# Patient Record
Sex: MALE | Race: BLACK OR AFRICAN AMERICAN | NOT HISPANIC OR LATINO | URBAN - METROPOLITAN AREA
[De-identification: names, ages, dates, MRNs, and addresses within clinical notes are randomized per-mention and may not be internally consistent; named-entity substitution may affect disease eponyms.]

---

## 2017-07-24 ENCOUNTER — INPATIENT (INPATIENT)
Facility: HOSPITAL | Age: 37
LOS: 2 days | Discharge: HOME | End: 2017-07-27
Attending: INTERNAL MEDICINE | Admitting: INTERNAL MEDICINE
Payer: COMMERCIAL

## 2017-07-24 VITALS
SYSTOLIC BLOOD PRESSURE: 105 MMHG | DIASTOLIC BLOOD PRESSURE: 63 MMHG | RESPIRATION RATE: 17 BRPM | HEART RATE: 72 BPM | TEMPERATURE: 100 F | OXYGEN SATURATION: 99 %

## 2017-07-24 DIAGNOSIS — Z29.9 ENCOUNTER FOR PROPHYLACTIC MEASURES, UNSPECIFIED: ICD-10-CM

## 2017-07-24 DIAGNOSIS — N17.9 ACUTE KIDNEY FAILURE, UNSPECIFIED: ICD-10-CM

## 2017-07-24 DIAGNOSIS — R50.9 FEVER, UNSPECIFIED: ICD-10-CM

## 2017-07-24 DIAGNOSIS — Z98.890 OTHER SPECIFIED POSTPROCEDURAL STATES: Chronic | ICD-10-CM

## 2017-07-24 DIAGNOSIS — N05.9 UNSPECIFIED NEPHRITIC SYNDROME WITH UNSPECIFIED MORPHOLOGIC CHANGES: ICD-10-CM

## 2017-07-24 DIAGNOSIS — K80.20 CALCULUS OF GALLBLADDER WITHOUT CHOLECYSTITIS WITHOUT OBSTRUCTION: ICD-10-CM

## 2017-07-24 LAB
ALBUMIN SERPL ELPH-MCNC: 2.9 G/DL — LOW (ref 3.3–5)
ALP SERPL-CCNC: 48 U/L — SIGNIFICANT CHANGE UP (ref 40–120)
ALT FLD-CCNC: 18 U/L — SIGNIFICANT CHANGE UP (ref 4–41)
APPEARANCE UR: CLEAR — SIGNIFICANT CHANGE UP
AST SERPL-CCNC: 17 U/L — SIGNIFICANT CHANGE UP (ref 4–40)
BACTERIA # UR AUTO: SIGNIFICANT CHANGE UP
BASOPHILS # BLD AUTO: 0.03 K/UL — SIGNIFICANT CHANGE UP (ref 0–0.2)
BASOPHILS NFR BLD AUTO: 0.6 % — SIGNIFICANT CHANGE UP (ref 0–2)
BILIRUB SERPL-MCNC: 0.3 MG/DL — SIGNIFICANT CHANGE UP (ref 0.2–1.2)
BILIRUB UR-MCNC: NEGATIVE — SIGNIFICANT CHANGE UP
BLOOD UR QL VISUAL: HIGH
BUN SERPL-MCNC: 18 MG/DL — SIGNIFICANT CHANGE UP (ref 7–23)
C3 SERPL-MCNC: 88.1 MG/DL — LOW (ref 90–180)
C4 SERPL-MCNC: 13.2 MG/DL — SIGNIFICANT CHANGE UP (ref 10–40)
CALCIUM SERPL-MCNC: 8.3 MG/DL — LOW (ref 8.4–10.5)
CHLORIDE SERPL-SCNC: 104 MMOL/L — SIGNIFICANT CHANGE UP (ref 98–107)
CK SERPL-CCNC: 96 U/L — SIGNIFICANT CHANGE UP (ref 30–200)
CO2 SERPL-SCNC: 24 MMOL/L — SIGNIFICANT CHANGE UP (ref 22–31)
COLOR SPEC: YELLOW — SIGNIFICANT CHANGE UP
CREAT SERPL-MCNC: 1.66 MG/DL — HIGH (ref 0.5–1.3)
CRP SERPL-MCNC: 7.2 MG/L — HIGH (ref 0.3–5)
EOSINOPHIL # BLD AUTO: 0.09 K/UL — SIGNIFICANT CHANGE UP (ref 0–0.5)
EOSINOPHIL NFR BLD AUTO: 1.8 % — SIGNIFICANT CHANGE UP (ref 0–6)
ERYTHROCYTE [SEDIMENTATION RATE] IN BLOOD: 18 MM/HR — HIGH (ref 1–15)
GLUCOSE SERPL-MCNC: 115 MG/DL — HIGH (ref 70–99)
GLUCOSE UR-MCNC: NEGATIVE — SIGNIFICANT CHANGE UP
GRAN CASTS # UR COMP ASSIST: SIGNIFICANT CHANGE UP
HBA1C BLD-MCNC: 6.1 % — HIGH (ref 4–5.6)
HCT VFR BLD CALC: 36.3 % — LOW (ref 39–50)
HGB BLD-MCNC: 12.2 G/DL — LOW (ref 13–17)
HIV1 AG SER QL: SIGNIFICANT CHANGE UP
HIV1+2 AB SPEC QL: SIGNIFICANT CHANGE UP
HYALINE CASTS # UR AUTO: SIGNIFICANT CHANGE UP (ref 0–?)
IGA FLD-MCNC: 275 MG/DL — SIGNIFICANT CHANGE UP (ref 70–400)
IGG FLD-MCNC: 838 MG/DL — SIGNIFICANT CHANGE UP (ref 700–1600)
IGM SERPL-MCNC: 100 MG/DL — SIGNIFICANT CHANGE UP (ref 40–230)
IMM GRANULOCYTES # BLD AUTO: 0.01 # — SIGNIFICANT CHANGE UP
IMM GRANULOCYTES NFR BLD AUTO: 0.2 % — SIGNIFICANT CHANGE UP (ref 0–1.5)
KETONES UR-MCNC: NEGATIVE — SIGNIFICANT CHANGE UP
LEUKOCYTE ESTERASE UR-ACNC: NEGATIVE — SIGNIFICANT CHANGE UP
LYMPHOCYTES # BLD AUTO: 1.23 K/UL — SIGNIFICANT CHANGE UP (ref 1–3.3)
LYMPHOCYTES # BLD AUTO: 24.1 % — SIGNIFICANT CHANGE UP (ref 13–44)
MANUAL SMEAR VERIFICATION: SIGNIFICANT CHANGE UP
MCHC RBC-ENTMCNC: 29.8 PG — SIGNIFICANT CHANGE UP (ref 27–34)
MCHC RBC-ENTMCNC: 33.6 % — SIGNIFICANT CHANGE UP (ref 32–36)
MCV RBC AUTO: 88.5 FL — SIGNIFICANT CHANGE UP (ref 80–100)
MONOCYTES # BLD AUTO: 0.57 K/UL — SIGNIFICANT CHANGE UP (ref 0–0.9)
MONOCYTES NFR BLD AUTO: 11.2 % — SIGNIFICANT CHANGE UP (ref 2–14)
MORPHOLOGY BLD-IMP: SIGNIFICANT CHANGE UP
MUCOUS THREADS # UR AUTO: SIGNIFICANT CHANGE UP
NEUTROPHILS # BLD AUTO: 3.17 K/UL — SIGNIFICANT CHANGE UP (ref 1.8–7.4)
NEUTROPHILS NFR BLD AUTO: 62.1 % — SIGNIFICANT CHANGE UP (ref 43–77)
NITRITE UR-MCNC: NEGATIVE — SIGNIFICANT CHANGE UP
NON-SQ EPI CELLS # UR AUTO: <1 — SIGNIFICANT CHANGE UP
NRBC # FLD: 0 — SIGNIFICANT CHANGE UP
PH UR: 6.5 — SIGNIFICANT CHANGE UP (ref 4.6–8)
PLATELET # BLD AUTO: 188 K/UL — SIGNIFICANT CHANGE UP (ref 150–400)
PLATELET COUNT - ESTIMATE: NORMAL — SIGNIFICANT CHANGE UP
PMV BLD: 10.6 FL — SIGNIFICANT CHANGE UP (ref 7–13)
POTASSIUM SERPL-MCNC: 3.6 MMOL/L — SIGNIFICANT CHANGE UP (ref 3.5–5.3)
POTASSIUM SERPL-SCNC: 3.6 MMOL/L — SIGNIFICANT CHANGE UP (ref 3.5–5.3)
PROT SERPL-MCNC: 5.6 G/DL — LOW (ref 6–8.3)
PROT UR-MCNC: >600 — SIGNIFICANT CHANGE UP
RBC # BLD: 4.1 M/UL — LOW (ref 4.2–5.8)
RBC # FLD: 12.9 % — SIGNIFICANT CHANGE UP (ref 10.3–14.5)
RBC CASTS # UR COMP ASSIST: SIGNIFICANT CHANGE UP (ref 0–?)
REVIEW TO FOLLOW: YES — SIGNIFICANT CHANGE UP
RHEUMATOID FACT SERPL-ACNC: 7.5 IU/ML — SIGNIFICANT CHANGE UP
SODIUM SERPL-SCNC: 139 MMOL/L — SIGNIFICANT CHANGE UP (ref 135–145)
SP GR SPEC: 1.04 — HIGH (ref 1–1.03)
SQUAMOUS # UR AUTO: SIGNIFICANT CHANGE UP
TSH SERPL-MCNC: 1.1 UIU/ML — SIGNIFICANT CHANGE UP (ref 0.27–4.2)
UROBILINOGEN FLD QL: NORMAL E.U. — SIGNIFICANT CHANGE UP (ref 0.1–0.2)
WBC # BLD: 5.1 K/UL — SIGNIFICANT CHANGE UP (ref 3.8–10.5)
WBC # FLD AUTO: 5.1 K/UL — SIGNIFICANT CHANGE UP (ref 3.8–10.5)
WBC CLUMPS #/AREA URNS HPF: PRESENT — HIGH (ref 0–?)
WBC UR QL: HIGH (ref 0–?)

## 2017-07-24 PROCEDURE — 99223 1ST HOSP IP/OBS HIGH 75: CPT | Mod: GC

## 2017-07-24 PROCEDURE — 84165 PROTEIN E-PHORESIS SERUM: CPT | Mod: 26

## 2017-07-24 PROCEDURE — 76775 US EXAM ABDO BACK WALL LIM: CPT | Mod: 26

## 2017-07-24 PROCEDURE — 71020: CPT | Mod: 26

## 2017-07-24 PROCEDURE — 99223 1ST HOSP IP/OBS HIGH 75: CPT | Mod: AI,GC

## 2017-07-24 RX ORDER — SODIUM CHLORIDE 9 MG/ML
1000 INJECTION INTRAMUSCULAR; INTRAVENOUS; SUBCUTANEOUS ONCE
Qty: 0 | Refills: 0 | Status: COMPLETED | OUTPATIENT
Start: 2017-07-24 | End: 2017-07-24

## 2017-07-24 RX ORDER — ACETAMINOPHEN 500 MG
650 TABLET ORAL ONCE
Qty: 0 | Refills: 0 | Status: COMPLETED | OUTPATIENT
Start: 2017-07-24 | End: 2017-07-24

## 2017-07-24 RX ORDER — SODIUM CHLORIDE 9 MG/ML
1000 INJECTION INTRAMUSCULAR; INTRAVENOUS; SUBCUTANEOUS
Qty: 0 | Refills: 0 | Status: DISCONTINUED | OUTPATIENT
Start: 2017-07-24 | End: 2017-07-25

## 2017-07-24 RX ORDER — ACETAMINOPHEN 500 MG
650 TABLET ORAL EVERY 6 HOURS
Qty: 0 | Refills: 0 | Status: DISCONTINUED | OUTPATIENT
Start: 2017-07-24 | End: 2017-07-27

## 2017-07-24 RX ADMIN — Medication 650 MILLIGRAM(S): at 12:04

## 2017-07-24 RX ADMIN — SODIUM CHLORIDE 100 MILLILITER(S): 9 INJECTION INTRAMUSCULAR; INTRAVENOUS; SUBCUTANEOUS at 18:20

## 2017-07-24 RX ADMIN — Medication 650 MILLIGRAM(S): at 14:38

## 2017-07-24 RX ADMIN — SODIUM CHLORIDE 1000 MILLILITER(S): 9 INJECTION INTRAMUSCULAR; INTRAVENOUS; SUBCUTANEOUS at 10:15

## 2017-07-24 NOTE — ED PROVIDER NOTE - OBJECTIVE STATEMENT
38 yo man p/w fever. Has had intermittent fever x2 days, max 101 at home. During this time he states he has also had intermittent swelling and pain in all of his joints, worse in knees, elbows. States his wife has felt unwell during this time as well. Had chlamydia >10 yrs ago, treated, but no other STIs. Reports he is sexually active only w/ his wife. Denies cough, abd pain, n/v/d, urinary symptoms, joint stiffness, inability to ambulate, penile discharge. 38 yo man with no hx presents to ed c/o fever, polyarthralgia, myalgia, fatigue, hot flashes, weigh loss and anorexia. fever x2 days, max 101 at home. During this time he states he has also had intermittent swelling and pain in all of his joints, worse in knees, elbows. States his wife has felt unwell during this time as well. Had chlamydia >10 yrs ago, treated, but no other STIs. Reports he is sexually active only w/ his wife. STI and HIV testing neg 1 yr ago. no fam hx of vasculitis, no autoimmune d/o, no insect or tick exposure, no rashes.  Denies cough, abd pain, n/v/d, urinary symptoms, joint stiffness, inability to ambulate, penile discharge.  + decrease urinary output.
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
## 2017-07-24 NOTE — ED PROVIDER NOTE - MEDICAL DECISION MAKING DETAILS
38 yo man w/ fever, intermittent joint swelling. Normal exam. Unknown etiology, though will check labs, ua, tsh, cxr. Discharge pending normal workup w/ outpt f/u.

## 2017-07-24 NOTE — H&P ADULT - PROBLEM SELECTOR PLAN 2
- Cr is 1.66  - unknown baseline   - Mostly likely due to nephritic picture  - Monitor Cr   - Renal US: Bilateral echogenic kidneys, compatible with medical renal disease  - urine lytes  - Monitor I/Os

## 2017-07-24 NOTE — CONSULT NOTE ADULT - ATTENDING COMMENTS
Patient with above history concerning for primary glomerular etiology given proteinuria microscopic hematuria and GIFTY, and hypoalbuminemia.  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  Other possibilities include primary or secondary membranous.  If creatinine worsens and proteinuria persists the patient will need a kidney biopsy.

## 2017-07-24 NOTE — CONSULT NOTE ADULT - ASSESSMENT
38 yo M with no PMHx admitted for fever x 2 days, polyarticular pain, 20 lb weight loss found to have GIFTY with proteinuria concerning for nephritic syndrome 38 yo M with no PMHx admitted for fever x 2 days, polyarticular pain, 20 lb weight loss found to have GIFTY concerning for nephritic/nephrotic syndrome

## 2017-07-24 NOTE — H&P ADULT - NSHPPHYSICALEXAM_GEN_ALL_CORE
Vital Signs Last 24 Hrs  T(C): 36.8 (24 Jul 2017 12:18), Max: 37.6 (24 Jul 2017 02:18)  T(F): 98.3 (24 Jul 2017 12:18), Max: 99.6 (24 Jul 2017 02:18)  HR: 50 (24 Jul 2017 12:18) (50 - 72)  BP: 109/72 (24 Jul 2017 12:18) (105/63 - 132/65)  BP(mean): --  RR: 16 (24 Jul 2017 12:18) (14 - 17)  SpO2: 100% (24 Jul 2017 12:18) (99% - 100%)    PHYSICAL EXAM:  GENERAL: NAD, well-groomed, well-developed  HEAD:  Atraumatic, Normocephalic  EYES: EOMI, PERRLA, conjunctiva and sclera clear  ENMT: No tonsillar erythema, exudates, or enlargement; Moist mucous membranes, Good dentition, No lesions  NECK: Supple, No JVD  CHEST/LUNG: Clear to percussion bilaterally; No rales, rhonchi, wheezing, or rubs  HEART: Regular rate and rhythm; No murmurs, rubs, or gallops  ABDOMEN: Soft, Nontender, Nondistended; Bowel sounds present. No CVA tenderness   VASCULAR:  2+ Peripheral Pulses, No clubbing, cyanosis, or edema.   MSK: No joint effusions or tenderness. NROM  SKIN: No rashes, petechiae, or lesions  NERVOUS SYSTEM:  Alert & Oriented X3, Good concentration; Motor Strength 5/5 B/L upper and lower extremities

## 2017-07-24 NOTE — H&P ADULT - HISTORY OF PRESENT ILLNESS
37 y.o. male with no PMHx presenting with fevers and joint pain over the last two days. The patient states on Friday after work he was having joint pain, erthyema and swelling in both knees and elbows. On Saturday the patient was having fevers, chills, night sweats, along with the diffuse joint pain overall felt very fatigue. Temperature taken at home on Sunday was 101 F per the wife, however the joint swelling and pain had resolved on its own. The patient states he has been having episodes of "hot flashes" and chills over the last two weeks. 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 Also endorses decreased PO intake and 20lbs weight loss over the last two weeks as well. The patient denies cough, nasal congestion, dyspnea, chest pain, palpations, vomiting, abd. pain, diarrhea, constipation, dysuria, urinary frequency, leg swelling, and rashes. Denies any sick contacts at home or recent travel. The patient took one Motrin on the day of admission. The patient states he smokes marijuana daily mixed with fronto leaf for the last few months. Previous history of heavy EtOH use of 8 years ago, was drinking up to one bottle a day of heavy liquor. The patient is now down to one glass of liquor a day, no history of withdrawal symptoms or hospitalizations.     In the ED:    The patient received one dose of Tylenol and 1L bolus of normal saline.

## 2017-07-24 NOTE — ED ADULT NURSE NOTE - OBJECTIVE STATEMENT
Pt received to room 13 A&Ox3 c/o headache, nausea, bilateral knee and joint swelling since Saturday. Denies

## 2017-07-24 NOTE — H&P ADULT - PROBLEM SELECTOR PLAN 4
- Renal US showed a 1mm stone in the gallbladder   - Pt asymptomatic   - No plan for intervention at this time

## 2017-07-24 NOTE — H&P ADULT - FAMILY HISTORY
No significant family history Mother  Still living? Unknown  Family history of diabetes mellitus (DM), Age at diagnosis: Age Unknown     Aunt  Still living? Unknown  Family history of systemic lupus erythematosus, Age at diagnosis: Age Unknown

## 2017-07-24 NOTE — CONSULT NOTE ADULT - PROBLEM SELECTOR RECOMMENDATION 9
Cr elevated to 1.166; BUN/Cr < 20 most likely intrinsic renal disease  Recommend spot urine protein/creat ratio   Recommend obtaining urine studies  Recommend HIV, HEP panel   VASYL, anti-Banks, C3, C4 for lupus work up   Please obtain Doppler b/l Renal Venous study to r/o thrombosis   Recommend utox   Will consider renal biopsy Cr elevated to 1.166; BUN/Cr < 20 most likely intrinsic renal disease  Recommend spot urine protein/creat ratio   Recommend obtaining urine studies   Recommend HIV, HEP panel   VASYL, dsDNA, anti-Banks, C3, C4 for lupus work up and possible endocarditis  RPR, anti-phospholipase A2 receptor antibody, parvovirus IgM, IgG  Please obtain Doppler b/l Renal Venous study to r/o thrombosis   Recommend utox   Will consider renal biopsy  Please avoid nephrotoxic agents: NSAIDS, Aspirin, etc   Optimize hemodynamics   HbA1c

## 2017-07-24 NOTE — H&P ADULT - NSHPLABSRESULTS_GEN_ALL_CORE
Labs:        139  |  104  |  18  ----------------------------<  115<H>  3.6   |  24  |  1.66<H>    Ca    8.3<L>      2017 06:20    TPro  5.6<L>  /  Alb  2.9<L>  /  TBili  0.3  /  DBili  x   /  AST  17  /  ALT  18  /  AlkPhos  48                    Urinalysis Basic - ( 2017 08:05 )    Color: YELLOW / Appearance: CLEAR / S.039 / pH: 6.5  Gluc: NEGATIVE / Ketone: NEGATIVE  / Bili: NEGATIVE / Urobili: NORMAL E.U.   Blood: MODERATE / Protein: >600 / Nitrite: NEGATIVE   Leuk Esterase: NEGATIVE / RBC: 0-2 / WBC 5-10   Sq Epi: OCC / Non Sq Epi: x / Bacteria: FEW                            12.2   5.10  )-----------( 188      ( 2017 06:20 )             36.3       Auto Basophil # 0.03  Auto Basophil % 0.6  Auto Eosinophil# 0.09  Auto Eosinophil %1.8  Auto Lymph # 1.23  Auto Lymph % 24.1  Auto Mono # 0.57  Auto Mono % 11.2  Auto Neutrophil # 3.17  Auto Neutrophil % 62.1  Band Neutrophils % --    Creatine Kinase, Serum: 96: CKMB is no longer reflexively performed on elevated CK  results.  To get CKMB results please order "CK AND CKMB".  Effective Clarisse 15, 2016. u/L (17 @ 06:20)    Radiology:    < from: US Renal (17 @ 11:09) >    IMPRESSION:     Bilateral echogenic kidneys, compatible with medical renal disease. No   hydronephrosis.    Cholelithiasis.    Xray Chest 2 Views PA/Lat (17 @ 06:41)     FINDINGS:     Status post sternotomy. Multiple surgical clips overlie the left upper   lobe.  The lungs are clear. There is no pneumothorax, no pleural effusions.   Cardiac size is within normal limits.  The visualized osseous structures demonstrate no acute abnormality.    IMPRESSION:   Clear lungs.    EKst degree AV block, sinus danny cardia

## 2017-07-24 NOTE — CONSULT NOTE ADULT - SUBJECTIVE AND OBJECTIVE BOX
NEPHROLOGY CONSULTATION NOTE    Patient is a 37y Male with no significant PMHx whom presented to the hospital with fever x 2 days and polyarticular pain. History obtained from patient and wife at bedside. Per wife, she brought him to the ED because "he didn't look so great." She reports objective fever to 101 this morning with polyarticular pain. Patient stated b/l elbow and b/l knees tender to palpation, erythematous, and edematous. He reports night sweat, hot flashes x 2 days and unintentional weight loss of 20 lbs in 2 weeks with decreased appetite. Patient states no prior occurrence. He reports occasional NSAID use, more acutely to 3x/day for 3 days due to headaches, throbbing, bilateral, with no other neurological symptoms. He reports no other medication use including Aspirin, anti-HTN, anti-DM. He reports occasional drug abuse, daily mairjuna use (1-2 joints/day), distant ETOH abuse 1 bottle/day 5 years ago, now occasional use, occasional cocaine use. Patient is a  x 10 years. No history of DVT or PE. Unknown HIV, Hepatitis history. Patient has a family history of DM (mother), Lupus (aunt).     PAST MEDICAL & SURGICAL HISTORY:  No pertinent past medical history    Allergies:  All fruits (Anaphylaxis)  No Known Drug Allergies    Home Medications Reviewed  Hospital Medications:   MEDICATIONS  (STANDING):    SOCIAL HISTORY:  Denies ETOH,Smoking,   FAMILY HISTORY:      REVIEW OF SYSTEMS:  CONSTITUTIONAL: No weakness, fevers or chills  EYES/ENT: No visual changes;  No vertigo or throat pain   NECK: No pain or stiffness  RESPIRATORY: No cough, wheezing, hemoptysis; No shortness of breath  CARDIOVASCULAR: No chest pain or palpitations.  GASTROINTESTINAL: No abdominal or epigastric pain. No nausea, vomiting, or hematemesis; No diarrhea or constipation. No melena or hematochezia.  GENITOURINARY: No dysuria, frequency, foamy urine, urinary urgency, incontinence or hematuria  NEUROLOGICAL: No numbness or weakness  SKIN: No itching, burning, rashes, or lesions   VASCULAR: No bilateral lower extremity edema.   All other review of systems is negative unless indicated above.    VITALS:  T(F): 98.3 (17 @ 12:18), Max: 99.6 (17 @ 02:18)  HR: 50 (17 @ 12:18)  BP: 109/72 (17 @ 12:18)  RR: 16 (17 @ 12:18)  SpO2: 100% (17 @ 12:18)  Wt(kg): --      PHYSICAL EXAM:  Constitutional: NAD  HEENT: anicteric sclera, oropharynx clear, MMM  Neck: No JVD  Respiratory: CTAB, no wheezes, rales or rhonchi  Cardiovascular: S1, S2, RRR  Gastrointestinal: BS+, soft, NT/ND  Extremities: No cyanosis or clubbing. No peripheral edema  Neurological: A/O x 3, no focal deficits  Psychiatric: Normal mood, normal affect  : No CVA tenderness. No loving.   Skin: No rashes  Vascular Access:    LABS:      139  |  104  |  18  ----------------------------<  115<H>  3.6   |  24  |  1.66<H>    Ca    8.3<L>      2017 06:20    TPro  5.6<L>  /  Alb  2.9<L>  /  TBili  0.3  /  DBili      /  AST  17  /  ALT  18  /  AlkPhos  48      Creatinine Trend: 1.66 <--                        12.2   5.10  )-----------( 188      ( 2017 06:20 )             36.3     Urine Studies:  Urinalysis Basic - ( 2017 08:05 )    Color: YELLOW / Appearance: CLEAR / S.039 / pH: 6.5  Gluc: NEGATIVE / Ketone: NEGATIVE  / Bili: NEGATIVE / Urobili: NORMAL E.U.   Blood: MODERATE / Protein: >600 / Nitrite: NEGATIVE   Leuk Esterase: NEGATIVE / RBC: 0-2 / WBC 5-10   Sq Epi: OCC / Non Sq Epi:  / Bacteria: FEW                RADIOLOGY & ADDITIONAL STUDIES: NEPHROLOGY CONSULTATION NOTE    Patient is a 37y Male with no significant PMHx whom presented to the hospital with fever x 2 days and polyarticular pain. History obtained from patient and wife at bedside. Per wife, she brought him to the ED because "he didn't look so great." He reports objective fever to 101 this morning with polyarticular pain. Patient reports b/l elbow and b/l knees tender to palpation, erythematous, and edematous, acutely overnight, which has now resolved. He reports night sweat, hot flashes x 5 days and unintentional weight loss of 20 lbs in 2 weeks with decreased appetite. Patient states no prior occurrence. He reports occasional NSAID use, more acutely to 3x/day for 3 days due to headaches, throbbing, bilateral, with no other neurological symptoms. He reports no other medication use including Aspirin, anti-HTN, anti-DM. He reports daily mairjuna use (1-2 joints/day), distant ETOH abuse 1 bottle/day 5 years ago, now occasional use, occasional cocaine use. Patient is a  x 10 years. No history of DVT or PE. Unknown HIV, Hepatitis history. Patient has a family history of DM (mother), Lupus (aunt).     PAST MEDICAL & SURGICAL HISTORY:  No pertinent past medical history    Allergies:  All fruits (Anaphylaxis)  No Known Drug Allergies    Home Medications Reviewed  Hospital Medications:   MEDICATIONS  (STANDING):    SOCIAL HISTORY:  Denies ETOH,Smoking,   FAMILY HISTORY:      REVIEW OF SYSTEMS:  CONSTITUTIONAL: No weakness, fevers or chills  EYES/ENT: No visual changes;  No vertigo or throat pain   NECK: No pain or stiffness  RESPIRATORY: No cough, wheezing, hemoptysis; No shortness of breath  CARDIOVASCULAR: No chest pain or palpitations.  GASTROINTESTINAL: No abdominal or epigastric pain. No nausea, vomiting, or hematemesis; No diarrhea or constipation. No melena or hematochezia.  GENITOURINARY: No dysuria, frequency, foamy urine, urinary urgency, incontinence or hematuria  NEUROLOGICAL: No numbness or weakness  SKIN: No itching, burning, rashes, or lesions   VASCULAR: No bilateral lower extremity edema.   All other review of systems is negative unless indicated above.    VITALS:  T(F): 98.3 (17 @ 12:18), Max: 99.6 (17 @ 02:18)  HR: 50 (17 @ 12:18)  BP: 109/72 (17 @ 12:18)  RR: 16 (17 @ 12:18)  SpO2: 100% (17 @ 12:18)  Wt(kg): --      PHYSICAL EXAM:  Constitutional: middle aged male, in NAD  HEENT: anicteric sclera, oropharynx clear, MMM  Neck: No JVD  Respiratory: CTAB, no wheezes, rales or rhonchi  Cardiovascular: S1, S2, RRR  Gastrointestinal: BS+, soft, NT/ND  Extremities: No cyanosis or clubbing. No peripheral edema  Neurological: A/O x 3, no focal deficits  Psychiatric: Normal mood, normal affect  : No CVA tenderness. No loving.   Skin: No rashes  Vascular Access:    LABS:      139  |  104  |  18  ----------------------------<  115<H>  3.6   |  24  |  1.66<H>    Ca    8.3<L>      2017 06:20    TPro  5.6<L>  /  Alb  2.9<L>  /  TBili  0.3  /  DBili      /  AST  17  /  ALT  18  /  AlkPhos  48      Creatinine Trend: 1.66 <--                        12.2   5.10  )-----------( 188      ( 2017 06:20 )             36.3     Urine Studies:  Urinalysis Basic - ( 2017 08:05 )    Color: YELLOW / Appearance: CLEAR / S.039 / pH: 6.5  Gluc: NEGATIVE / Ketone: NEGATIVE  / Bili: NEGATIVE / Urobili: NORMAL E.U.   Blood: MODERATE / Protein: >600 / Nitrite: NEGATIVE   Leuk Esterase: NEGATIVE / RBC: 0-2 / WBC 5-10   Sq Epi: OCC / Non Sq Epi:  / Bacteria: FEW                RADIOLOGY & ADDITIONAL STUDIES: NEPHROLOGY CONSULTATION NOTE    Patient is a 37y Male with no significant PMHx whom presented to the hospital with fever x 2 days and polyarticular pain. History obtained from patient and wife at bedside. Per wife, she brought him to the ED because "he didn't look so great." He reports objective fever to 101 this morning with polyarticular pain. Patient reports b/l elbow and b/l knees tender to palpation, erythematous, and edematous, acutely overnight, which has now resolved. He reports night sweat, hot flashes x 5 days and unintentional weight loss of 20 lbs in 2 weeks with decreased appetite. Patient states no prior occurrence. He reports occasional NSAID use, more acutely to 3x/day for 3 days due to headaches, throbbing, bilateral, with no other neurological symptoms. He reports no other medication use including Aspirin, anti-HTN, anti-DM. He reports daily mairjuna use (1-2 joints/day), distant ETOH abuse 1 bottle/day 5 years ago, now occasional use, occasional cocaine use. Patient is a  x 10 years. No history of DVT or PE. Unknown HIV, Hepatitis history. Patient has a family history of DM (mother), Lupus (aunt).     PAST MEDICAL & SURGICAL HISTORY:  No pertinent past medical history    Allergies:  All fruits (Anaphylaxis)  No Known Drug Allergies    Home Medications Reviewed  Hospital Medications:   MEDICATIONS  (STANDING):    SOCIAL HISTORY:  Denies ETOH,Smoking,   FAMILY HISTORY:  Aunt with lupus    REVIEW OF SYSTEMS:  CONSTITUTIONAL: weakness fever chills night sweats weight loss  EYES/ENT: No visual changes  NECK: No pain or stiffness  RESPIRATORY: No cough, wheezing, hemoptysis; No shortness of breath  CARDIOVASCULAR: No chest pain or palpitations.  GASTROINTESTINAL: No abdominal or epigastric pain but + nausea  GENITOURINARY: no urinary changes  NEUROLOGICAL: No numbness or weakness, +headaches  SKIN: No itching, burning, rashes, or lesions   VASCULAR: No bilateral lower extremity edema.   MSK: see HPI  All other review of systems is negative unless indicated above.    VITALS:  T(F): 98.3 (17 @ 12:18), Max: 99.6 (17 @ 02:18)  HR: 50 (17 @ 12:18)  BP: 109/72 (17 @ 12:18)  RR: 16 (17 @ 12:18)  SpO2: 100% (17 @ 12:18)  Wt(kg): --      PHYSICAL EXAM:  Constitutional: middle aged male, in NAD  HEENT: anicteric sclera, oropharynx clear, MMM  Neck: No JVD  Respiratory: CTAB, no wheezes, rales or rhonchi  Cardiovascular: S1, S2, RRR  Gastrointestinal: BS+, soft, NT/ND  Extremities: No cyanosis or clubbing. No peripheral edema  Neurological: A/O x 3, no focal deficits  Psychiatric: Normal mood, normal affect  : No CVA tenderness. No loving.   Skin: No rashes    LABS:      139  |  104  |  18  ----------------------------<  115<H>  3.6   |  24  |  1.66<H>    Ca    8.3<L>      2017 06:20    TPro  5.6<L>  /  Alb  2.9<L>  /  TBili  0.3  /  DBili      /  AST  17  /  ALT  18  /  AlkPhos  48      Creatinine Trend: 1.66 <--                        12.2   5.10  )-----------( 188      ( 2017 06:20 )             36.3     Urine Studies:  Urinalysis Basic - ( 2017 08:05 )    Color: YELLOW / Appearance: CLEAR / S.039 / pH: 6.5  Gluc: NEGATIVE / Ketone: NEGATIVE  / Bili: NEGATIVE / Urobili: NORMAL E.U.   Blood: MODERATE / Protein: >600 / Nitrite: NEGATIVE   Leuk Esterase: NEGATIVE / RBC: 0-2 / WBC 5-10   Sq Epi: OCC / Non Sq Epi:  / Bacteria: FEW                RADIOLOGY & ADDITIONAL STUDIES:

## 2017-07-24 NOTE — H&P ADULT - PROBLEM SELECTOR PLAN 3
- Fever at home of 101 F  - none on admission so far...  - Could be due to infectious or rheumatological causes   - Monitor vital signs

## 2017-07-24 NOTE — ED PROVIDER NOTE - MUSCULOSKELETAL, MLM
No spinal midline tenderness with palpation. All joints ranged without deformity or tenderness or effusion or warmth.

## 2017-07-24 NOTE — H&P ADULT - ASSESSMENT
37 y.o. male with no PMHx admitted for fever with findings concerning for nephritic origin. At this time the differenetial is vast with possible causes are SLE, IgA nephropathy, vasculitis,  MPGN, Hepatitis, HIV or drug reaction.

## 2017-07-24 NOTE — H&P ADULT - NSHPREVIEWOFSYSTEMS_GEN_ALL_CORE
REVIEW OF SYSTEMS:  CONSTITUTIONAL: + for fever, weight loss, fatigue, chills, night sweats  EYES: No eye pain, visual disturbances, or discharge  ENMT:  No difficulty hearing, tinnitus, vertigo; No sinus or throat pain  NECK: No pain or stiffness  RESPIRATORY: No cough, wheezing, or hemoptysis; No shortness of breath  CARDIOVASCULAR: No chest pain, palpitations, dizziness, or leg swelling  GASTROINTESTINAL: No abdominal or epigastric pain. No nausea, vomiting, or hematemesis; No diarrhea or constipation. No melena or hematochezia.  GENITOURINARY: No dysuria, frequency, hematuria, or incontinence  NEUROLOGICAL: No headaches, memory loss, loss of strength, numbness, or tremors  SKIN: No itching, burning, rashes, or lesions   MUSCULOSKELETAL: + for joint pain and swelling   PSYCHIATRIC: No depression, anxiety, mood swings, or difficulty sleeping  HEME/LYMPH: No easy bruising, or bleeding gums

## 2017-07-24 NOTE — H&P ADULT - PROBLEM SELECTOR PLAN 1
- Pt is presenting with fevers, joint pain and swelling  - UA showed moderate blood and >600 protein and BMP showed a Cr of 1.66  - UA only had 0-2 RBCs, more consistent with Rhabdomyolysis, but CK was 96  - US renal showed echogenic kidney, consistent with primary renal disease   - Differential at this time is vast:  vasculitis, IgA nephropathy MPGN, SLE, Hepatitis, HIV or drug reaction.   - Check VASYL, Anti-dsDNA, C3-4, Cryo, ANCA with reflex, HIV, Hepatitis panel, urine Protein/Cr ratio, blood cultures, ASLO, A1C  - Pt uses Fronto leaf with his marijuana, a tobacco base leaf for the last few months. Possible source of the nephritic syndrome.  - Consider Nephrology consult

## 2017-07-24 NOTE — H&P ADULT - NSHPSOCIALHISTORY_GEN_ALL_CORE
Social History:    Marital Status:  (  x )    (   ) Single    (   )    (  )   Occupation:   Lives with: (  ) alone  (x  ) children   ( x ) spouse   (  ) parents  (  ) other    Substance Use (street drugs): (  ) never used  (x  ) other: marijuana daily   Tobacco Usage:  (   ) never smoked   (  x ) former smoker   (   ) current smoker : Black&Mild for 1 year  Alcohol Usage: one drink a day   Sexual History: Monogamous with wife

## 2017-07-24 NOTE — ED PROVIDER NOTE - ATTENDING CONTRIBUTION TO CARE
I was physically present for the E/M service provided. I agree with above history, physical, and plan which I have reviewed and edited where appropriate. I was physically present for the key portions of the service provided. 36 yo man with no hx presents to ed c/o fever, polyarthralgia, myalgia, fatigue, hot flashes, weigh loss and anorexia. fever x2 days, max 101 at home. During this time he states he has also had intermittent swelling and pain in all of his joints, worse in knees, elbows. States his wife has felt unwell during this time as well. Had chlamydia >10 yrs ago, treated, but no other STIs. Reports he is sexually active only w/ his wife. STI and HIV testing neg 1 yr ago. no fam hx of vasculitis, no autoimmune d/o, no insect or tick exposure, no rashes.  Denies cough, abd pain, n/v/d, urinary symptoms, joint stiffness, inability to ambulate, penile discharge.  + decrease urinary output.    nospecific constitutional sx without any significant risk factor for STI/HIV, vasculitis, autoimmune d/o will obtain basic labs, ua and if normal f/u with outpt pcp.    lab shows creatinine 1.6 pending ua.  if abnormal will admit if neg dc.  Concern for renal pathology.

## 2017-07-25 LAB
ALBUMIN SERPL ELPH-MCNC: 2.3 G/DL — LOW (ref 3.3–5)
ALP SERPL-CCNC: 51 U/L — SIGNIFICANT CHANGE UP (ref 40–120)
ALT FLD-CCNC: 21 U/L — SIGNIFICANT CHANGE UP (ref 4–41)
APTT BLD: 37.5 SEC — HIGH (ref 27.5–37.4)
AST SERPL-CCNC: 22 U/L — SIGNIFICANT CHANGE UP (ref 4–40)
BASOPHILS # BLD AUTO: 0.05 K/UL — SIGNIFICANT CHANGE UP (ref 0–0.2)
BASOPHILS NFR BLD AUTO: 1 % — SIGNIFICANT CHANGE UP (ref 0–2)
BILIRUB SERPL-MCNC: 0.5 MG/DL — SIGNIFICANT CHANGE UP (ref 0.2–1.2)
BUN SERPL-MCNC: 18 MG/DL — SIGNIFICANT CHANGE UP (ref 7–23)
C-ANCA SER-ACNC: NEGATIVE — SIGNIFICANT CHANGE UP
CALCIUM SERPL-MCNC: 7.8 MG/DL — LOW (ref 8.4–10.5)
CHLORIDE SERPL-SCNC: 106 MMOL/L — SIGNIFICANT CHANGE UP (ref 98–107)
CO2 SERPL-SCNC: 23 MMOL/L — SIGNIFICANT CHANGE UP (ref 22–31)
CREAT ?TM UR-MCNC: 246.36 MG/DL — SIGNIFICANT CHANGE UP
CREAT SERPL-MCNC: 1.61 MG/DL — HIGH (ref 0.5–1.3)
DSDNA AB SER-ACNC: <12 IU/ML — SIGNIFICANT CHANGE UP
EOSINOPHIL # BLD AUTO: 0.08 K/UL — SIGNIFICANT CHANGE UP (ref 0–0.5)
EOSINOPHIL NFR BLD AUTO: 1.6 % — SIGNIFICANT CHANGE UP (ref 0–6)
GLUCOSE SERPL-MCNC: 94 MG/DL — SIGNIFICANT CHANGE UP (ref 70–99)
HCT VFR BLD CALC: 35.7 % — LOW (ref 39–50)
HGB BLD-MCNC: 12.1 G/DL — LOW (ref 13–17)
IMM GRANULOCYTES # BLD AUTO: 0.01 # — SIGNIFICANT CHANGE UP
IMM GRANULOCYTES NFR BLD AUTO: 0.2 % — SIGNIFICANT CHANGE UP (ref 0–1.5)
INR BLD: 0.95 — SIGNIFICANT CHANGE UP (ref 0.88–1.17)
LYMPHOCYTES # BLD AUTO: 1.18 K/UL — SIGNIFICANT CHANGE UP (ref 1–3.3)
LYMPHOCYTES # BLD AUTO: 24.2 % — SIGNIFICANT CHANGE UP (ref 13–44)
MAGNESIUM SERPL-MCNC: 2 MG/DL — SIGNIFICANT CHANGE UP (ref 1.6–2.6)
MCHC RBC-ENTMCNC: 30 PG — SIGNIFICANT CHANGE UP (ref 27–34)
MCHC RBC-ENTMCNC: 33.9 % — SIGNIFICANT CHANGE UP (ref 32–36)
MCV RBC AUTO: 88.6 FL — SIGNIFICANT CHANGE UP (ref 80–100)
MONOCYTES # BLD AUTO: 0.63 K/UL — SIGNIFICANT CHANGE UP (ref 0–0.9)
MONOCYTES NFR BLD AUTO: 12.9 % — SIGNIFICANT CHANGE UP (ref 2–14)
NEUTROPHILS # BLD AUTO: 2.93 K/UL — SIGNIFICANT CHANGE UP (ref 1.8–7.4)
NEUTROPHILS NFR BLD AUTO: 60.1 % — SIGNIFICANT CHANGE UP (ref 43–77)
NRBC # FLD: 0 — SIGNIFICANT CHANGE UP
P-ANCA SER-ACNC: NEGATIVE — SIGNIFICANT CHANGE UP
PHOSPHATE SERPL-MCNC: 3.5 MG/DL — SIGNIFICANT CHANGE UP (ref 2.5–4.5)
PLATELET # BLD AUTO: 201 K/UL — SIGNIFICANT CHANGE UP (ref 150–400)
PMV BLD: 10.6 FL — SIGNIFICANT CHANGE UP (ref 7–13)
POTASSIUM SERPL-MCNC: 3.9 MMOL/L — SIGNIFICANT CHANGE UP (ref 3.5–5.3)
POTASSIUM SERPL-SCNC: 3.9 MMOL/L — SIGNIFICANT CHANGE UP (ref 3.5–5.3)
PROT SERPL-MCNC: 5 G/DL — LOW (ref 6–8.3)
PROT UR-MCNC: > 200 MG/DL — SIGNIFICANT CHANGE UP
PROTHROM AB SERPL-ACNC: 10.6 SEC — SIGNIFICANT CHANGE UP (ref 9.8–13.1)
RBC # BLD: 4.03 M/UL — LOW (ref 4.2–5.8)
RBC # FLD: 12.7 % — SIGNIFICANT CHANGE UP (ref 10.3–14.5)
SODIUM SERPL-SCNC: 140 MMOL/L — SIGNIFICANT CHANGE UP (ref 135–145)
SPECIMEN SOURCE: SIGNIFICANT CHANGE UP
SPECIMEN SOURCE: SIGNIFICANT CHANGE UP
WBC # BLD: 4.88 K/UL — SIGNIFICANT CHANGE UP (ref 3.8–10.5)
WBC # FLD AUTO: 4.88 K/UL — SIGNIFICANT CHANGE UP (ref 3.8–10.5)

## 2017-07-25 PROCEDURE — 99233 SBSQ HOSP IP/OBS HIGH 50: CPT | Mod: GC

## 2017-07-25 RX ORDER — CALCIUM CARBONATE 500(1250)
1 TABLET ORAL ONCE
Qty: 0 | Refills: 0 | Status: COMPLETED | OUTPATIENT
Start: 2017-07-25 | End: 2017-07-25

## 2017-07-25 RX ORDER — SODIUM CHLORIDE 9 MG/ML
1000 INJECTION INTRAMUSCULAR; INTRAVENOUS; SUBCUTANEOUS
Qty: 0 | Refills: 0 | Status: DISCONTINUED | OUTPATIENT
Start: 2017-07-25 | End: 2017-07-25

## 2017-07-25 RX ORDER — SODIUM CHLORIDE 9 MG/ML
1000 INJECTION INTRAMUSCULAR; INTRAVENOUS; SUBCUTANEOUS
Qty: 0 | Refills: 0 | Status: DISCONTINUED | OUTPATIENT
Start: 2017-07-25 | End: 2017-07-27

## 2017-07-25 RX ORDER — ONDANSETRON 8 MG/1
4 TABLET, FILM COATED ORAL EVERY 8 HOURS
Qty: 0 | Refills: 0 | Status: DISCONTINUED | OUTPATIENT
Start: 2017-07-25 | End: 2017-07-27

## 2017-07-25 RX ORDER — ONDANSETRON 8 MG/1
4 TABLET, FILM COATED ORAL EVERY 6 HOURS
Qty: 0 | Refills: 0 | Status: COMPLETED | OUTPATIENT
Start: 2017-07-25 | End: 2017-07-25

## 2017-07-25 RX ADMIN — Medication 650 MILLIGRAM(S): at 23:02

## 2017-07-25 RX ADMIN — ONDANSETRON 4 MILLIGRAM(S): 8 TABLET, FILM COATED ORAL at 14:19

## 2017-07-25 RX ADMIN — SODIUM CHLORIDE 75 MILLILITER(S): 9 INJECTION INTRAMUSCULAR; INTRAVENOUS; SUBCUTANEOUS at 14:19

## 2017-07-25 RX ADMIN — Medication 650 MILLIGRAM(S): at 23:45

## 2017-07-25 NOTE — PROGRESS NOTE ADULT - SUBJECTIVE AND OBJECTIVE BOX
Nephrology progress note    Patient is a 37y Male with    Allergies:  All fruits (Blisters)  No Known Drug Allergies    Hospital Medications:   MEDICATIONS  (STANDING):  sodium chloride 0.9%. 1000 milliLiter(s) (100 mL/Hr) IV Continuous <Continuous>    REVIEW OF SYSTEMS:  CONSTITUTIONAL: No weakness, fevers or chills  EYES/ENT: No visual changes;  No vertigo or throat pain   NECK: No pain or stiffness  RESPIRATORY: No cough, wheezing, hemoptysis; No shortness of breath  CARDIOVASCULAR: No chest pain or palpitations.  GASTROINTESTINAL: No abdominal or epigastric pain. No nausea, vomiting, or hematemesis; No diarrhea or constipation. No melena or hematochezia.  GENITOURINARY: No dysuria, frequency, foamy urine, urinary urgency, incontinence or hematuria  NEUROLOGICAL: No numbness or weakness  SKIN: No itching, burning, rashes, or lesions   VASCULAR: No bilateral lower extremity edema.   All other review of systems is negative unless indicated above.    VITALS:  T(F): 98.9 (17 @ 06:46), Max: 98.9 (17 @ 06:46)  HR: 50 (17 @ 06:46)  BP: 113/65 (17 @ 06:46)  RR: 16 (17 @ 06:46)  SpO2: 100% (17 @ 06:46)  Wt(kg): --    Height (cm): 193.04 ( @ 16:39)  Weight (kg): 83.9 ( @ 16:39)  BMI (kg/m2): 22.5 ( @ 16:39)  BSA (m2): 2.14 ( @ 16:39)  PHYSICAL EXAM:  Constitutional: NAD  HEENT: anicteric sclera, oropharynx clear, MMM  Neck: No JVD  Respiratory: CTAB, no wheezes, rales or rhonchi  Cardiovascular: S1, S2, RRR  Gastrointestinal: BS+, soft, NT/ND  Extremities: No cyanosis or clubbing. No peripheral edema  Neurological: A/O x 3, no focal deficits  Psychiatric: Normal mood, normal affect  : No CVA tenderness. No loving.   Skin: No rashes  Vascular Access:    LABS:      139  |  104  |  18  ----------------------------<  115<H>  3.6   |  24  |  1.66<H>    Ca    8.3<L>      2017 06:20    TPro  5.6<L>  /  Alb  2.9<L>  /  TBili  0.3  /  DBili      /  AST  17  /  ALT  18  /  AlkPhos  48  -                          12.1   4.88  )-----------( 201      ( 2017 06:00 )             35.7       Urine Studies:  Urinalysis Basic - ( 2017 08:05 )    Color: YELLOW / Appearance: CLEAR / S.039 / pH: 6.5  Gluc: NEGATIVE / Ketone: NEGATIVE  / Bili: NEGATIVE / Urobili: NORMAL E.U.   Blood: MODERATE / Protein: >600 / Nitrite: NEGATIVE   Leuk Esterase: NEGATIVE / RBC: 0-2 / WBC 5-10   Sq Epi: OCC / Non Sq Epi:  / Bacteria: FEW        RADIOLOGY & ADDITIONAL STUDIES: Nephrology progress note    Patient seen and examined at bedside. Patient endorses intermittent nausea with decreased PO intake. Patient reports for the past 2 weeks he not been eating due to increased work pressure, but now he cannot tolerate PO due to nausea. Patient denies fevers, abdominal pain, joint pain, vomiting, diarrhea, constipation. Patient also reports no improvement in weakness and fatigue. Patient otherwise has no complaints.     Allergies:  All fruits (Blisters)  No Known Drug Allergies    Hospital Medications:   MEDICATIONS  (STANDING):  sodium chloride 0.9%. 1000 milliLiter(s) (100 mL/Hr) IV Continuous <Continuous>    REVIEW OF SYSTEMS:  CONSTITUTIONAL: +weakness, + chills, no fevers   EYES/ENT: No visual changes;  No vertigo or throat pain   NECK: No pain or stiffness  RESPIRATORY: No cough, wheezing, hemoptysis; No shortness of breath  CARDIOVASCULAR: No chest pain or palpitations.  GASTROINTESTINAL: No abdominal or epigastric pain. No nausea, vomiting, or hematemesis; No diarrhea or constipation. No melena or hematochezia.  GENITOURINARY: No dysuria, frequency, foamy urine, urinary urgency, incontinence or hematuria  NEUROLOGICAL: No numbness or weakness  SKIN: No itching, burning, rashes, or lesions   VASCULAR: No bilateral lower extremity edema.   All other review of systems is negative unless indicated above.    VITALS:  T(F): 98.9 (17 @ 06:46), Max: 98.9 (17 @ 06:46)  HR: 50 (17 @ 06:46)  BP: 113/65 (17 @ 06:46)  RR: 16 (17 @ 06:46)  SpO2: 100% (17 @ 06:46)  Wt(kg): --    Height (cm): 193.04 ( @ 16:39)  Weight (kg): 83.9 ( @ 16:39)  BMI (kg/m2): 22.5 ( @ 16:39)  BSA (m2): 2.14 ( @ 16:39)  PHYSICAL EXAM:    Constitutional: NAD  HEENT: anicteric sclera, oropharynx clear, MMM  Neck: No JVD  Respiratory: CTAB, no wheezes, rales or rhonchi  Cardiovascular: S1, S2, RRR  Gastrointestinal: BS+, soft, NT/ND  Extremities: No cyanosis or clubbing. No peripheral edema  Neurological: A/O x 3, no focal deficits  Psychiatric: Normal mood, normal affect  : No CVA tenderness. No loving.   Skin: No rashes      LABS:      139  |  104  |  18  ----------------------------<  115<H>  3.6   |  24  |  1.66<H>    Ca    8.3<L>      2017 06:20    TPro  5.6<L>  /  Alb  2.9<L>  /  TBili  0.3  /  DBili      /  AST  17  /  ALT  18  /  AlkPhos  48                            12.1   4.88  )-----------( 201      ( 2017 06:00 )             35.7       Urine Studies:  Urinalysis Basic - ( 2017 08:05 )    Color: YELLOW / Appearance: CLEAR / S.039 / pH: 6.5  Gluc: NEGATIVE / Ketone: NEGATIVE  / Bili: NEGATIVE / Urobili: NORMAL E.U.   Blood: MODERATE / Protein: >600 / Nitrite: NEGATIVE   Leuk Esterase: NEGATIVE / RBC: 0-2 / WBC 5-10   Sq Epi: OCC / Non Sq Epi:  / Bacteria: FEW        RADIOLOGY & ADDITIONAL STUDIES:

## 2017-07-25 NOTE — PROGRESS NOTE ADULT - ASSESSMENT
36 yo M with no PMHx admitted for fever x 2 days, polyarticular pain, 20 lb weight loss found to have GIFTY concerning for nephritic/nephrotic syndrome

## 2017-07-25 NOTE — PROGRESS NOTE ADULT - PROBLEM SELECTOR PLAN 1
- Pt is presenting with fevers, joint pain and swelling  - UA showed moderate blood and >600 protein and BMP showed a Cr of 1.66  - UA only had 0-2 RBCs, more consistent with Rhabdomyolysis, but CK was 96  - US renal showed echogenic kidney, consistent with primary renal disease   - Differential at this time is vast:  vasculitis, IgA nephropathy MPGN, SLE, Hepatitis, HIV or drug reaction.   - Check VASYL, Anti-dsDNA, C3-4, Cryo, ANCA with reflex, HIV, Hepatitis panel, urine Protein/Cr ratio, blood cultures, ASLO, A1C  - Pt uses Fronto leaf with his marijuana, a tobacco base leaf for the last few months. Possible source of the nephritic syndrome.  - Consider Nephrology consult - Pt is presenting with fevers, joint pain and swelling  - UA showed moderate blood and >600 protein and BMP showed a Cr of 1.66  - UA only had 0-2 RBCs, more consistent with Rhabdomyolysis, but CK was 96  - US renal showed echogenic kidney, consistent with primary renal disease   - Differential at this time is vast:  vasculitis, IgA nephropathy MPGN, SLE, Hepatitis, HIV or drug reaction.   - f/u  VASYL, Anti-dsDNA, C3-4, Cryo, ANCA with reflex, Hepatitis panel, urine Protein/Cr ratio, blood cultures, ASLO, A1C  - Pt uses Fronto leaf with his marijuana, a tobacco base leaf for the last few months, no reports of kidney injury from use  - Nephrology consult, kidney bx tmrw. NPO at [facility]

## 2017-07-25 NOTE — PROGRESS NOTE ADULT - PROBLEM SELECTOR PLAN 2
- Cr is 1.66  - unknown baseline   - Mostly likely due to nephritic picture  - Monitor Cr   - Renal US: Bilateral echogenic kidneys, compatible with medical renal disease  - urine lytes  - Monitor I/Os - Cr is 1.66  - unknown baseline   - Mostly likely due to nephritic picture  - Monitor Cr   - Renal US: Bilateral echogenic kidneys, compatible with medical renal disease  - urine lytes  - Monitor I/Os  -cont IVF

## 2017-07-25 NOTE — PROGRESS NOTE ADULT - PROBLEM SELECTOR PLAN 3
- Fever at home of 89 F  - none on admission so far...  - Could be due to infectious or rheumatological causes   - Monitor vital signs Resolved. Could be due to infectious or rheumatological causes

## 2017-07-25 NOTE — PROGRESS NOTE ADULT - PROBLEM SELECTOR PLAN 1
Recommendation: Cr elevated to 1.166; BUN/Cr < 20 most likely intrinsic renal disease  Recommend spot urine protein/creat ratio   Recommend obtaining urine studies   Recommend HIV, HEP panel   VASYL, dsDNA, anti-Banks, C3, C4 for lupus work up and possible endocarditis  RPR, anti-phospholipase A2 receptor antibody, parvovirus IgM, IgG  Please obtain Doppler b/l Renal Venous study to r/o thrombosis   Recommend utox   Will consider renal biopsy  Please avoid nephrotoxic agents: NSAIDS, Aspirin, etc   Optimize hemodynamics   HbA1c. Cr elevated; BUN/Cr < 20 most likely intrinsic renal disease  Would need spot urine protein/creat ratio to assist differentiation nephritic/nephrotic syndrome;  UA revealed moderate blood, but only 0-2 RBCs, with normal CK, suggesting heme pigment induced GIFTY  Recommend obtaining urine studies   Negative HIV, waiting HEP panel   VASYL, dsDNA, anti-Banks, C3, C4 for lupus work up and possible endocarditis  RPR, anti-phospholipase A2 receptor antibody, please send parvovirus IgM, IgG  Please obtain Doppler b/l Renal Venous study to r/o thrombosis   Recommend utox   Will likely do renal biopsy later this week   Please avoid nephrotoxic agents: NSAIDS, Aspirin, etc   Optimize hemodynamics Cr elevated; BUN/Cr < 20 most likely intrinsic renal disease  Would need spot urine protein/creat ratio to assist differentiation nephritic/nephrotic syndrome;  UA revealed moderate blood, but only 0-2 RBCs, with normal CK, suggesting heme pigment induced GIFTY  Recommend obtaining urine studies   Negative HIV, waiting HEP panel   VASYL, dsDNA, anti-Banks, C3, C4 for lupus work up  RPR, anti-phospholipase A2 receptor antibody, please send parvovirus IgM, IgG  Recommend utox   Will likely do renal biopsy later this week   Please avoid nephrotoxic agents: NSAIDS, Aspirin, etc   Optimize hemodynamics

## 2017-07-25 NOTE — PROGRESS NOTE ADULT - SUBJECTIVE AND OBJECTIVE BOX
Patient is a 37y old  Male who presents with a chief complaint of fever (2017 13:00)      SUBJECTIVE / OVERNIGHT EVENTS:  -    Vital Signs Last 24 Hrs  T(C): 37.2 (2017 06:46), Max: 37.2 (2017 06:46)  T(F): 98.9 (2017 06:46), Max: 98.9 (2017 06:46)  HR: 50 (2017 06:46) (50 - 62)  BP: 113/65 (2017 06:46) (109/72 - 115/60)  BP(mean): --  RR: 16 (2017 06:46) (16 - 18)  SpO2: 100% (2017 06:46) (100% - 100%)    CAPILLARY BLOOD GLUCOSE      I&O's Summary      PHYSICAL EXAM:  GENERAL: NAD, well-groomed, well-developed  HEAD:  Atraumatic, Normocephalic  EYES: EOMI, PERRLA, conjunctiva and sclera clear  ENMT: No tonsillar erythema, exudates, or enlargement; Moist mucous membranes, Good dentition, No lesions  NECK: Supple, No JVD  CHEST/LUNG: Clear to percussion bilaterally; No rales, rhonchi, wheezing, or rubs  HEART: Regular rate and rhythm; No murmurs, rubs, or gallops  ABDOMEN: Soft, Nontender, Nondistended; Bowel sounds present. No CVA tenderness   VASCULAR:  2+ Peripheral Pulses, No clubbing, cyanosis, or edema.   MSK: No joint effusions or tenderness. NROM  SKIN: No rashes, petechiae, or lesions  NERVOUS SYSTEM:  Alert & Oriented X3, Good concentration; Motor Strength 5/5 B/L upper and lower extremities      MEDICATIONS  (STANDING):  sodium chloride 0.9%. 1000 milliLiter(s) (100 mL/Hr) IV Continuous <Continuous>    MEDICATIONS  (PRN):  acetaminophen   Tablet 650 milliGRAM(s) Oral every 6 hours PRN For Temp greater than 38 C (100.4 F)  acetaminophen   Tablet. 650 milliGRAM(s) Oral every 6 hours PRN Mild and Moderate Pain        LABS:                   Urinalysis Basic - ( 2017 08:05 )    Color: YELLOW / Appearance: CLEAR / S.039 / pH: 6.5  Gluc: NEGATIVE / Ketone: NEGATIVE  / Bili: NEGATIVE / Urobili: NORMAL E.U.   Blood: MODERATE / Protein: >600 / Nitrite: NEGATIVE   Leuk Esterase: NEGATIVE / RBC: 0-2 / WBC 5-10   Sq Epi: OCC / Non Sq Epi: x / Bacteria: FEW        RADIOLOGY & ADDITIONAL TESTS:   < from: Xray Chest 2 Views PA/Lat (17 @ 06:41) >  FINDINGS:     Status post sternotomy. Multiple surgical clips overlie the left upper   lobe.  The lungs are clear. There is no pneumothorax, no pleural effusions.   Cardiac size is within normal limits.  The visualized osseous structures demonstrate no acute abnormality.    IMPRESSION:   Clear lungs.    MICROBIOLOGY    CONSULTS: nephrology Patient is a 37y old  Male who presents with a chief complaint of fever (2017 13:00)      SUBJECTIVE / OVERNIGHT EVENTS:  No complaints this morning, afebrile    Vital Signs Last 24 Hrs  T(C): 37.2 (2017 06:46), Max: 37.2 (2017 06:46)  T(F): 98.9 (2017 06:46), Max: 98.9 (2017 06:46)  HR: 50 (2017 06:46) (50 - 62)  BP: 113/65 (2017 06:46) (109/72 - 115/60)  BP(mean): --  RR: 16 (2017 06:46) (16 - 18)  SpO2: 100% (2017 06:46) (100% - 100%)    CAPILLARY BLOOD GLUCOSE      I&O's Summary      PHYSICAL EXAM:  GENERAL: NAD, well-groomed, well-developed  HEAD:  Atraumatic, Normocephalic  EYES: EOMI, PERRLA, conjunctiva and sclera clear  ENMT: No tonsillar erythema, exudates, or enlargement; Moist mucous membranes, Good dentition, No lesions  NECK: Supple, No JVD  CHEST/LUNG: Clear to percussion bilaterally; No rales, rhonchi, wheezing, or rubs  HEART: Regular rate and rhythm; No murmurs, rubs, or gallops  ABDOMEN: Soft, Nontender, Nondistended; Bowel sounds present. No CVA tenderness   VASCULAR:  2+ Peripheral Pulses, No clubbing, cyanosis, or edema.   MSK: No joint effusions or tenderness. NROM  SKIN: No rashes, petechiae, or lesions  NERVOUS SYSTEM:  Alert & Oriented X3, Good concentration; Motor Strength 5/5 B/L upper and lower extremities      MEDICATIONS  (STANDING):  sodium chloride 0.9%. 1000 milliLiter(s) (100 mL/Hr) IV Continuous <Continuous>    MEDICATIONS  (PRN):  acetaminophen   Tablet 650 milliGRAM(s) Oral every 6 hours PRN For Temp greater than 38 C (100.4 F)  acetaminophen   Tablet. 650 milliGRAM(s) Oral every 6 hours PRN Mild and Moderate Pain        LABS:                   Urinalysis Basic - ( 2017 08:05 )    Color: YELLOW / Appearance: CLEAR / S.039 / pH: 6.5  Gluc: NEGATIVE / Ketone: NEGATIVE  / Bili: NEGATIVE / Urobili: NORMAL E.U.   Blood: MODERATE / Protein: >600 / Nitrite: NEGATIVE   Leuk Esterase: NEGATIVE / RBC: 0-2 / WBC 5-10   Sq Epi: OCC / Non Sq Epi: x / Bacteria: FEW        RADIOLOGY & ADDITIONAL TESTS:   < from: Xray Chest 2 Views PA/Lat (17 @ 06:41) >  FINDINGS:     Status post sternotomy. Multiple surgical clips overlie the left upper   lobe.  The lungs are clear. There is no pneumothorax, no pleural effusions.   Cardiac size is within normal limits.  The visualized osseous structures demonstrate no acute abnormality.    IMPRESSION:   Clear lungs.    MICROBIOLOGY    CONSULTS: nephrology Patient is a 37y old  Male who presents with a chief complaint of fever (2017 13:00)      SUBJECTIVE / OVERNIGHT EVENTS:  No complaints this morning, afebrile. Joint pain and swelling resolved.    Vital Signs Last 24 Hrs  T(C): 37.2 (2017 06:46), Max: 37.2 (2017 06:46)  T(F): 98.9 (2017 06:46), Max: 98.9 (2017 06:46)  HR: 50 (2017 06:46) (50 - 62)  BP: 113/65 (2017 06:46) (109/72 - 115/60)  BP(mean): --  RR: 16 (2017 06:46) (16 - 18)  SpO2: 100% (2017 06:46) (100% - 100%)    CAPILLARY BLOOD GLUCOSE      I&O's Summary      PHYSICAL EXAM:  GENERAL: NAD, well-groomed, well-developed  HEAD:  Atraumatic, Normocephalic  EYES: EOMI, PERRLA, conjunctiva and sclera clear  ENMT: No tonsillar erythema, exudates, or enlargement; Moist mucous membranes, Good dentition, No lesions  NECK: Supple, No JVD  CHEST/LUNG: Clear to percussion bilaterally; No rales, rhonchi, wheezing, or rubs  HEART: Regular rate and rhythm; No murmurs, rubs, or gallops  ABDOMEN: Soft, Nontender, Nondistended; Bowel sounds present. No CVA tenderness   VASCULAR:  2+ Peripheral Pulses, No clubbing, cyanosis, or edema.   MSK: No joint effusions or tenderness. NROM  SKIN: No rashes, petechiae, or lesions  NERVOUS SYSTEM:  Alert & Oriented X3, Good concentration; Motor Strength 5/5 B/L upper and lower extremities      MEDICATIONS  (STANDING):  sodium chloride 0.9%. 1000 milliLiter(s) (100 mL/Hr) IV Continuous <Continuous>  calcium carbonate 500 mG (Tums) Chewable 1 Tablet(s) Chew once  sodium chloride 0.9%. 1000 milliLiter(s) (75 mL/Hr) IV Continuous <Continuous>    MEDICATIONS  (PRN):  acetaminophen   Tablet 650 milliGRAM(s) Oral every 6 hours PRN For Temp greater than 38 C (100.4 F)  acetaminophen   Tablet. 650 milliGRAM(s) Oral every 6 hours PRN Mild and Moderate Pain  ondansetron Injectable 4 milliGRAM(s) IV Push every 8 hours PRN Nausea and/or Vomiting      LABS:                        12.1   4.88  )-----------( 201      ( 2017 06:00 )             35.7     2017 06:00    140    |  106    |  18     ----------------------------<  94     3.9     |  23     |  1.61     Ca    7.8        2017 06:00  Phos  3.5       2017 06:00  Mg     2.0       2017 06:00    TPro  5.0    /  Alb  2.3    /  TBili  0.5    /  DBili  x      /  AST  22     /  ALT  21     /  AlkPhos  51     2017 06:00          Urinalysis Basic - ( 2017 08:05 )    Color: YELLOW / Appearance: CLEAR / S.039 / pH: 6.5  Gluc: NEGATIVE / Ketone: NEGATIVE  / Bili: NEGATIVE / Urobili: NORMAL E.U.   Blood: MODERATE / Protein: >600 / Nitrite: NEGATIVE   Leuk Esterase: NEGATIVE / RBC: 0-2 / WBC 5-10   Sq Epi: OCC / Non Sq Epi: x / Bacteria: FEW        RADIOLOGY & ADDITIONAL TESTS:   < from: Xray Chest 2 Views PA/Lat (17 @ 06:41) >  FINDINGS:     Status post sternotomy. Multiple surgical clips overlie the left upper   lobe.  The lungs are clear. There is no pneumothorax, no pleural effusions.   Cardiac size is within normal limits.  The visualized osseous structures demonstrate no acute abnormality.    IMPRESSION:   Clear lungs.    MICROBIOLOGY    CONSULTS: nephrology

## 2017-07-26 ENCOUNTER — RESULT REVIEW (OUTPATIENT)
Age: 37
End: 2017-07-26

## 2017-07-26 DIAGNOSIS — N04.9 NEPHROTIC SYNDROME WITH UNSPECIFIED MORPHOLOGIC CHANGES: ICD-10-CM

## 2017-07-26 LAB
AMPHET UR-MCNC: NEGATIVE — SIGNIFICANT CHANGE UP
APTT BLD: 34.5 SEC — SIGNIFICANT CHANGE UP (ref 27.5–37.4)
BARBITURATES UR SCN-MCNC: NEGATIVE — SIGNIFICANT CHANGE UP
BASOPHILS # BLD AUTO: 0.03 K/UL — SIGNIFICANT CHANGE UP (ref 0–0.2)
BASOPHILS NFR BLD AUTO: 0.7 % — SIGNIFICANT CHANGE UP (ref 0–2)
BENZODIAZ UR-MCNC: POSITIVE — SIGNIFICANT CHANGE UP
BUN SERPL-MCNC: 22 MG/DL — SIGNIFICANT CHANGE UP (ref 7–23)
CALCIUM SERPL-MCNC: 7.6 MG/DL — LOW (ref 8.4–10.5)
CANNABINOIDS UR-MCNC: POSITIVE — SIGNIFICANT CHANGE UP
CHLORIDE SERPL-SCNC: 108 MMOL/L — HIGH (ref 98–107)
CHOLEST SERPL-MCNC: 109 MG/DL — LOW (ref 120–199)
CO2 SERPL-SCNC: 23 MMOL/L — SIGNIFICANT CHANGE UP (ref 22–31)
COCAINE METAB.OTHER UR-MCNC: NEGATIVE — SIGNIFICANT CHANGE UP
CREAT SERPL-MCNC: 1.82 MG/DL — HIGH (ref 0.5–1.3)
EOSINOPHIL # BLD AUTO: 0.1 K/UL — SIGNIFICANT CHANGE UP (ref 0–0.5)
EOSINOPHIL NFR BLD AUTO: 2.2 % — SIGNIFICANT CHANGE UP (ref 0–6)
GLUCOSE SERPL-MCNC: 92 MG/DL — SIGNIFICANT CHANGE UP (ref 70–99)
HBV SURFACE AG SER-ACNC: NEGATIVE — SIGNIFICANT CHANGE UP
HCT VFR BLD CALC: 34.2 % — LOW (ref 39–50)
HCV AB S/CO SERPL IA: 0.2 S/CO — SIGNIFICANT CHANGE UP
HCV AB SERPL-IMP: SIGNIFICANT CHANGE UP
HDLC SERPL-MCNC: 21 MG/DL — LOW (ref 35–55)
HGB BLD-MCNC: 11.7 G/DL — LOW (ref 13–17)
IMM GRANULOCYTES # BLD AUTO: 0.01 # — SIGNIFICANT CHANGE UP
IMM GRANULOCYTES NFR BLD AUTO: 0.2 % — SIGNIFICANT CHANGE UP (ref 0–1.5)
INR BLD: 0.96 — SIGNIFICANT CHANGE UP (ref 0.88–1.17)
LIPID PNL WITH DIRECT LDL SERPL: 68 MG/DL — SIGNIFICANT CHANGE UP
LYMPHOCYTES # BLD AUTO: 1.21 K/UL — SIGNIFICANT CHANGE UP (ref 1–3.3)
LYMPHOCYTES # BLD AUTO: 26.6 % — SIGNIFICANT CHANGE UP (ref 13–44)
MAGNESIUM SERPL-MCNC: 2.1 MG/DL — SIGNIFICANT CHANGE UP (ref 1.6–2.6)
MCHC RBC-ENTMCNC: 30 PG — SIGNIFICANT CHANGE UP (ref 27–34)
MCHC RBC-ENTMCNC: 34.2 % — SIGNIFICANT CHANGE UP (ref 32–36)
MCV RBC AUTO: 87.7 FL — SIGNIFICANT CHANGE UP (ref 80–100)
METHADONE UR-MCNC: NEGATIVE — SIGNIFICANT CHANGE UP
MONOCYTES # BLD AUTO: 0.65 K/UL — SIGNIFICANT CHANGE UP (ref 0–0.9)
MONOCYTES NFR BLD AUTO: 14.3 % — HIGH (ref 2–14)
NEUTROPHILS # BLD AUTO: 2.55 K/UL — SIGNIFICANT CHANGE UP (ref 1.8–7.4)
NEUTROPHILS NFR BLD AUTO: 56 % — SIGNIFICANT CHANGE UP (ref 43–77)
NRBC # FLD: 0 — SIGNIFICANT CHANGE UP
OPIATES UR-MCNC: NEGATIVE — SIGNIFICANT CHANGE UP
OXYCODONE UR-MCNC: NEGATIVE — SIGNIFICANT CHANGE UP
PCP UR-MCNC: NEGATIVE — SIGNIFICANT CHANGE UP
PHOSPHATE SERPL-MCNC: 3.2 MG/DL — SIGNIFICANT CHANGE UP (ref 2.5–4.5)
PLATELET # BLD AUTO: 199 K/UL — SIGNIFICANT CHANGE UP (ref 150–400)
PMV BLD: 10.6 FL — SIGNIFICANT CHANGE UP (ref 7–13)
POTASSIUM SERPL-MCNC: 4 MMOL/L — SIGNIFICANT CHANGE UP (ref 3.5–5.3)
POTASSIUM SERPL-SCNC: 4 MMOL/L — SIGNIFICANT CHANGE UP (ref 3.5–5.3)
PROTHROM AB SERPL-ACNC: 10.7 SEC — SIGNIFICANT CHANGE UP (ref 9.8–13.1)
RBC # BLD: 3.9 M/UL — LOW (ref 4.2–5.8)
RBC # FLD: 12.6 % — SIGNIFICANT CHANGE UP (ref 10.3–14.5)
SODIUM SERPL-SCNC: 139 MMOL/L — SIGNIFICANT CHANGE UP (ref 135–145)
TRIGL SERPL-MCNC: 123 MG/DL — SIGNIFICANT CHANGE UP (ref 10–149)
WBC # BLD: 4.55 K/UL — SIGNIFICANT CHANGE UP (ref 3.8–10.5)
WBC # FLD AUTO: 4.55 K/UL — SIGNIFICANT CHANGE UP (ref 3.8–10.5)

## 2017-07-26 PROCEDURE — 99233 SBSQ HOSP IP/OBS HIGH 50: CPT | Mod: GC

## 2017-07-26 PROCEDURE — 88313 SPECIAL STAINS GROUP 2: CPT | Mod: 26

## 2017-07-26 PROCEDURE — 88346 IMFLUOR 1ST 1ANTB STAIN PX: CPT | Mod: 26

## 2017-07-26 PROCEDURE — 88312 SPECIAL STAINS GROUP 1: CPT | Mod: 26

## 2017-07-26 PROCEDURE — 88342 IMHCHEM/IMCYTCHM 1ST ANTB: CPT | Mod: 26

## 2017-07-26 PROCEDURE — 88348 ELECTRON MICROSCOPY DX: CPT | Mod: 26

## 2017-07-26 PROCEDURE — 88305 TISSUE EXAM BY PATHOLOGIST: CPT | Mod: 26

## 2017-07-26 PROCEDURE — 88350 IMFLUOR EA ADDL 1ANTB STN PX: CPT | Mod: 26

## 2017-07-26 RX ORDER — POLYETHYLENE GLYCOL 3350 17 G/17G
17 POWDER, FOR SOLUTION ORAL ONCE
Qty: 0 | Refills: 0 | Status: COMPLETED | OUTPATIENT
Start: 2017-07-26 | End: 2017-07-26

## 2017-07-26 RX ORDER — CALCIUM CARBONATE 500(1250)
2 TABLET ORAL ONCE
Qty: 0 | Refills: 0 | Status: DISCONTINUED | OUTPATIENT
Start: 2017-07-26 | End: 2017-07-27

## 2017-07-26 RX ORDER — LISINOPRIL 2.5 MG/1
2.5 TABLET ORAL DAILY
Qty: 0 | Refills: 0 | Status: DISCONTINUED | OUTPATIENT
Start: 2017-07-26 | End: 2017-07-26

## 2017-07-26 RX ADMIN — SODIUM CHLORIDE 100 MILLILITER(S): 9 INJECTION INTRAMUSCULAR; INTRAVENOUS; SUBCUTANEOUS at 17:19

## 2017-07-26 RX ADMIN — POLYETHYLENE GLYCOL 3350 17 GRAM(S): 17 POWDER, FOR SOLUTION ORAL at 21:27

## 2017-07-26 NOTE — PROGRESS NOTE ADULT - SUBJECTIVE AND OBJECTIVE BOX
Patient is a 37y old  Male who presents with a chief complaint of fever (24 Jul 2017 13:00)      SUBJECTIVE / OVERNIGHT EVENTS:  -No complaints. Denies joint pain, CP, SOB, dysuria, hematuria. Ambulating to bathroom w/o issue.      MEDICATIONS  (STANDING):  sodium chloride 0.9%. 1000 milliLiter(s) (100 mL/Hr) IV Continuous <Continuous>  calcium carbonate 500 mG (Tums) Chewable 2 Tablet(s) Chew once    MEDICATIONS  (PRN):  acetaminophen   Tablet 650 milliGRAM(s) Oral every 6 hours PRN For Temp greater than 38 C (100.4 F)  acetaminophen   Tablet. 650 milliGRAM(s) Oral every 6 hours PRN Mild and Moderate Pain  ondansetron Injectable 4 milliGRAM(s) IV Push every 8 hours PRN Nausea and/or Vomiting      Vital Signs Last 24 Hrs  T(C): 37.1 (26 Jul 2017 06:56), Max: 37.1 (25 Jul 2017 21:44)  T(F): 98.7 (26 Jul 2017 06:56), Max: 98.7 (25 Jul 2017 21:44)  HR: 58 (26 Jul 2017 06:56) (50 - 60)  BP: 116/62 (26 Jul 2017 06:56) (108/62 - 116/62)  BP(mean): --  RR: 18 (26 Jul 2017 06:56) (18 - 18)  SpO2: 98% (26 Jul 2017 06:56) (96% - 100%)    CAPILLARY BLOOD GLUCOSE    I&O's Summary      PHYSICAL EXAM:  GENERAL: NAD, well-groomed, well-developed  HEAD:  Atraumatic, Normocephalic  EYES: EOMI, PERRLA, conjunctiva and sclera clear  ENMT: No tonsillar erythema, exudates, or enlargement; Moist mucous membranes, Good dentition, No lesions  NECK: Supple, No JVD  CHEST/LUNG: Clear to percussion bilaterally; No rales, rubs. +occasional S4  ABDOMEN: Soft, Nontender, Nondistended; Bowel sounds present. No CVA tenderness   VASCULAR:  2+ Peripheral Pulses, No clubbing, cyanosis, or edema.   MSK: No joint effusions or tenderness. NROM  SKIN: No rashes, petechiae, or lesions  NERVOUS SYSTEM:  Alert & Oriented X3, fluent speech      LABS:                        11.7   4.55  )-----------( 199      ( 26 Jul 2017 06:26 )             34.2     26 Jul 2017 06:26    139    |  108    |  22     ----------------------------<  92     4.0     |  23     |  1.82     Ca    7.6        26 Jul 2017 06:26 (corrected 9.2)  Phos  3.2       26 Jul 2017 06:26  Mg     2.1       26 Jul 2017 06:26    PT/INR - ( 26 Jul 2017 06:26 )   PT: 10.7 SEC;   INR: 0.96       spot urine protein 1,777 mg/dl  spot urine Cr 246 mg/dl  C3 88 dec, CRP inc, ESR inc  UA moderate blood, >600 protein, 0-2 RBCs    pANCA, cANCA, RF, anti-ds DNA ab neg      RADIOLOGY & ADDITIONAL TESTS:      MICROBIOLOGY  BCx 7/24 neg    CONSULTS: nephrology

## 2017-07-26 NOTE — PROGRESS NOTE ADULT - PROBLEM SELECTOR PLAN 1
- Pt is presenting with fevers, joint pain and swelling. UA showed moderate blood and >600 protein, Cr of 1.66. UA only had 0-2 RBCs, more consistent with rhabdomyolysis, but CK was 96. US renal showed echogenic kidney, consistent with chronic primary renal dz. Unknown Cr baseline  - Differential at this time is vast:  vasculitis, IgA nephropathy MPGN, SLE, Hepatitis, or drug reaction.   - Pt uses Fronto leaf with his marijuana, a tobacco base leaf for the last few months, no reports of kidney injury from use  - Mostly likely due to nephritic picture  - Monitor I/Os  -cont IVF  -kidney biopsy today  -f/u biopsy results, utox, ASLO, VASYL

## 2017-07-26 NOTE — PROGRESS NOTE ADULT - ASSESSMENT
38 yo M with no PMHx admitted for fever x 2 days, polyarticular pain, 20 lb weight loss found to have GIFTY concerning for nephritic/nephrotic syndrome.

## 2017-07-26 NOTE — PROGRESS NOTE ADULT - ASSESSMENT
37 y.o. male with no PMHx presenting with fever, polyarticular pain, GIFTY being worked up for nephritic/nephrotic syndrome.  At this time the differential is vast with possible causes are SLE, IgA nephropathy, vasculitis,  MPGN, Hepatitis, or drug reaction.

## 2017-07-26 NOTE — PROGRESS NOTE ADULT - PROBLEM SELECTOR PLAN 1
GIFTY in the setting of nephrotic range proteinuria (7g). Patient will benefit from renal biopsy. Case discussed at length with patient and Medical attending. Patients Scr increased to 1.82 today. Continue to monitor BMP. Patient may follow up biopsy results in office. Please avoid NSAIDs, RCA's and nephrotoxins

## 2017-07-26 NOTE — PROGRESS NOTE ADULT - PROBLEM SELECTOR PLAN 2
The patient does not have the complete nephrotic syndrome (no edema and lipids not elevated).  Right now there is no role for statins and there is no role for prophylactic anticoagulation (if this comes back as membranous then possibly).  Hold off on ACE - I for now given creatinine.  The patient will follow up with me in the office.

## 2017-07-26 NOTE — PROGRESS NOTE ADULT - SUBJECTIVE AND OBJECTIVE BOX
[facility] DIVISION OF KIDNEY DISEASES AND HYPERTENSION -- FOLLOW UP NOTE  --------------------------------------------------------------------------------    HPI: 36 yo male with no PMHx admitted with fever x 2 days, polyarticular pain, 20 lb weight loss. Patient being seen for GIFTY concerning for nephritic/nephrotic syndrome. Patient seen at bedside today. Patient has no complaints today. Patient denies CP, SOB and LE edema.       PAST HISTORY  --------------------------------------------------------------------------------  No significant changes to PMH, PSH, FHx, SHx, unless otherwise noted    ALLERGIES & MEDICATIONS  --------------------------------------------------------------------------------  Allergies    All fruits (Blisters)  No Known Drug Allergies    Intolerances      Standing Inpatient Medications  sodium chloride 0.9%. 1000 milliLiter(s) IV Continuous <Continuous>  calcium carbonate 500 mG (Tums) Chewable 2 Tablet(s) Chew once    PRN Inpatient Medications  acetaminophen   Tablet 650 milliGRAM(s) Oral every 6 hours PRN  acetaminophen   Tablet. 650 milliGRAM(s) Oral every 6 hours PRN  ondansetron Injectable 4 milliGRAM(s) IV Push every 8 hours PRN      REVIEW OF SYSTEMS  --------------------------------------------------------------------------------  Gen: No weakness  Skin: No rashes  Head/Eyes/Ears/Mouth: No headache  Respiratory: No dyspnea  CV: No chest pain  GI: No abdominal pain  : No increased frequency  MSK: No edema  Neuro: No dizziness/lightheadedness    All other systems were reviewed and are negative, except as noted.    VITALS/PHYSICAL EXAM  --------------------------------------------------------------------------------  T(C): 37.1 (07-26-17 @ 06:56), Max: 37.1 (07-25-17 @ 21:44)  HR: 58 (07-26-17 @ 06:56) (50 - 60)  BP: 116/62 (07-26-17 @ 06:56) (108/62 - 116/62)  RR: 18 (07-26-17 @ 06:56) (18 - 18)  SpO2: 98% (07-26-17 @ 06:56) (96% - 100%)  Wt(kg): --  Height (cm): 193.04 (07-24-17 @ 16:39)  Weight (kg): 83.9 (07-24-17 @ 16:39)  BMI (kg/m2): 22.5 (07-24-17 @ 16:39)  BSA (m2): 2.14 (07-24-17 @ 16:39)      07-25-17 @ 07:01  -  07-26-17 @ 07:00  --------------------------------------------------------  IN: 1200 mL / OUT: 0 mL / NET: 1200 mL      Physical Exam:  	Gen: NAD, well-appearing  	HEENT: Anicteric   	Pulm: CTA B/L  	CV: RRR, S1S2  	Abd: +BS, soft, nontender/nondistended  	MSK: Warm, no edema  	Neuro: No focal deficits  	Psych: Normal affect       LABS/STUDIES  --------------------------------------------------------------------------------              11.7   4.55  >-----------<  199      [07-26-17 @ 06:26]              34.2     139  |  108  |  22  ----------------------------<  92      [07-26-17 @ 06:26]  4.0   |  23  |  1.82        Ca     7.6     [07-26-17 @ 06:26]      Mg     2.1     [07-26-17 @ 06:26]      Phos  3.2     [07-26-17 @ 06:26]    TPro  5.0  /  Alb  2.3  /  TBili  0.5  /  DBili  x   /  AST  22  /  ALT  21  /  AlkPhos  51  [07-25-17 @ 06:00]    PT/INR: PT 10.7 , INR 0.96       [07-26-17 @ 06:26]  PTT: 34.5       [07-26-17 @ 06:26]      Creatinine Trend:  SCr 1.82 [07-26 @ 06:26]  SCr 1.61 [07-25 @ 06:00]  SCr 1.66 [07-24 @ 06:20]    Urinalysis - [07-24-17 @ 08:05]      Color YELLOW / Appearance CLEAR / SG 1.039 / pH 6.5      Gluc NEGATIVE / Ketone NEGATIVE  / Bili NEGATIVE / Urobili NORMAL       Blood MODERATE / Protein >600 / Leuk Est NEGATIVE / Nitrite NEGATIVE      RBC 0-2 / WBC 5-10 / Hyaline 5-10 / Gran 10-25 / Sq Epi OCC / Non Sq Epi  / Bacteria FEW    Urine Creatinine 246.36      [07-25-17 @ 18:50]  Urine Protein > 200      [07-25-17 @ 18:50]    HbA1c 6.1      [07-24-17 @ 17:30]  TSH 1.10      [07-24-17 @ 06:20]  Lipid: chol 109, , HDL 21, LDL 68      [07-26-17 @ 06:26]    HBsAg NEGATIVE      [07-26-17 @ 06:26]    dsDNA <12      [07-24-17 @ 17:30]  C3 Complement 88.1      [07-24-17 @ 17:30]  C4 Complement 13.2      [07-24-17 @ 17:30]  Rheumatoid Factor 7.5      [07-24-17 @ 17:30]  ANCA: cANCA Negative, pANCA Negative, atypical ANCA --      [07-24-17 @ 17:30]

## 2017-07-26 NOTE — PROGRESS NOTE ADULT - PROBLEM SELECTOR PLAN 3
- Renal US showed a 1mm stone in the gallbladder, asymptomatic   - No plan for intervention at this time

## 2017-07-27 ENCOUNTER — TRANSCRIPTION ENCOUNTER (OUTPATIENT)
Age: 37
End: 2017-07-27

## 2017-07-27 VITALS
DIASTOLIC BLOOD PRESSURE: 65 MMHG | RESPIRATION RATE: 18 BRPM | OXYGEN SATURATION: 98 % | HEART RATE: 51 BPM | SYSTOLIC BLOOD PRESSURE: 112 MMHG | TEMPERATURE: 99 F

## 2017-07-27 DIAGNOSIS — R80.9 PROTEINURIA, UNSPECIFIED: ICD-10-CM

## 2017-07-27 LAB
ASO AB SER QL: < 20 IU/ML — SIGNIFICANT CHANGE UP
B19V IGG SER QL: POSITIVE — SIGNIFICANT CHANGE UP
B19V IGG SER-ACNC: 4.9 INDEX — HIGH (ref 0–0.8)
B19V IGM FLD-ACNC: >12 INDEX — HIGH (ref 0–0.8)
B19V IGM SER-ACNC: POSITIVE — SIGNIFICANT CHANGE UP
BASOPHILS # BLD AUTO: 0.04 K/UL — SIGNIFICANT CHANGE UP (ref 0–0.2)
BASOPHILS NFR BLD AUTO: 0.8 % — SIGNIFICANT CHANGE UP (ref 0–2)
BUN SERPL-MCNC: 23 MG/DL — SIGNIFICANT CHANGE UP (ref 7–23)
CALCIUM SERPL-MCNC: 7.7 MG/DL — LOW (ref 8.4–10.5)
CCP AB SER-ACNC: NEGATIVE — SIGNIFICANT CHANGE UP
CHLORIDE SERPL-SCNC: 106 MMOL/L — SIGNIFICANT CHANGE UP (ref 98–107)
CO2 SERPL-SCNC: 23 MMOL/L — SIGNIFICANT CHANGE UP (ref 22–31)
CREAT SERPL-MCNC: 1.77 MG/DL — HIGH (ref 0.5–1.3)
EOSINOPHIL # BLD AUTO: 0.08 K/UL — SIGNIFICANT CHANGE UP (ref 0–0.5)
EOSINOPHIL NFR BLD AUTO: 1.5 % — SIGNIFICANT CHANGE UP (ref 0–6)
GLUCOSE SERPL-MCNC: 100 MG/DL — HIGH (ref 70–99)
HCT VFR BLD CALC: 33.4 % — LOW (ref 39–50)
HGB BLD-MCNC: 11.3 G/DL — LOW (ref 13–17)
IMM GRANULOCYTES # BLD AUTO: 0.02 # — SIGNIFICANT CHANGE UP
IMM GRANULOCYTES NFR BLD AUTO: 0.4 % — SIGNIFICANT CHANGE UP (ref 0–1.5)
LYMPHOCYTES # BLD AUTO: 1.23 K/UL — SIGNIFICANT CHANGE UP (ref 1–3.3)
LYMPHOCYTES # BLD AUTO: 23.3 % — SIGNIFICANT CHANGE UP (ref 13–44)
MAGNESIUM SERPL-MCNC: 2 MG/DL — SIGNIFICANT CHANGE UP (ref 1.6–2.6)
MCHC RBC-ENTMCNC: 29.4 PG — SIGNIFICANT CHANGE UP (ref 27–34)
MCHC RBC-ENTMCNC: 33.8 % — SIGNIFICANT CHANGE UP (ref 32–36)
MCV RBC AUTO: 87 FL — SIGNIFICANT CHANGE UP (ref 80–100)
MONOCYTES # BLD AUTO: 0.67 K/UL — SIGNIFICANT CHANGE UP (ref 0–0.9)
MONOCYTES NFR BLD AUTO: 12.7 % — SIGNIFICANT CHANGE UP (ref 2–14)
NEUTROPHILS # BLD AUTO: 3.25 K/UL — SIGNIFICANT CHANGE UP (ref 1.8–7.4)
NEUTROPHILS NFR BLD AUTO: 61.3 % — SIGNIFICANT CHANGE UP (ref 43–77)
NRBC # FLD: 0 — SIGNIFICANT CHANGE UP
PHOSPHATE SERPL-MCNC: 3.1 MG/DL — SIGNIFICANT CHANGE UP (ref 2.5–4.5)
PLATELET # BLD AUTO: 201 K/UL — SIGNIFICANT CHANGE UP (ref 150–400)
PMV BLD: 11 FL — SIGNIFICANT CHANGE UP (ref 7–13)
POTASSIUM SERPL-MCNC: 4.1 MMOL/L — SIGNIFICANT CHANGE UP (ref 3.5–5.3)
POTASSIUM SERPL-SCNC: 4.1 MMOL/L — SIGNIFICANT CHANGE UP (ref 3.5–5.3)
PREALB SERPL-MCNC: 12 MG/DL — LOW (ref 20–40)
RBC # BLD: 3.84 M/UL — LOW (ref 4.2–5.8)
RBC # FLD: 12.7 % — SIGNIFICANT CHANGE UP (ref 10.3–14.5)
SODIUM SERPL-SCNC: 138 MMOL/L — SIGNIFICANT CHANGE UP (ref 135–145)
T PALLIDUM AB TITR SER: NEGATIVE — SIGNIFICANT CHANGE UP
WBC # BLD: 5.29 K/UL — SIGNIFICANT CHANGE UP (ref 3.8–10.5)
WBC # FLD AUTO: 5.29 K/UL — SIGNIFICANT CHANGE UP (ref 3.8–10.5)

## 2017-07-27 PROCEDURE — 99239 HOSP IP/OBS DSCHRG MGMT >30: CPT

## 2017-07-27 PROCEDURE — 99233 SBSQ HOSP IP/OBS HIGH 50: CPT | Mod: GC

## 2017-07-27 PROCEDURE — 50200 RENAL BIOPSY PERQ: CPT | Mod: LT

## 2017-07-27 PROCEDURE — 76942 ECHO GUIDE FOR BIOPSY: CPT | Mod: 26

## 2017-07-27 RX ADMIN — Medication 650 MILLIGRAM(S): at 08:53

## 2017-07-27 RX ADMIN — Medication 650 MILLIGRAM(S): at 09:31

## 2017-07-27 NOTE — DISCHARGE NOTE ADULT - CARE PROVIDER_API CALL
Elmo Collazo (MD), Internal Medicine  [address] [place] [address]  Phone: [phone]  Fax: [phone]

## 2017-07-27 NOTE — PROGRESS NOTE ADULT - PROBLEM SELECTOR PLAN 1
- Pt is presenting with fevers, joint pain and swelling. UA showed moderate blood and >600 protein, Cr of 1.66. UA only had 0-2 RBCs, more consistent with rhabdomyolysis, but CK was 96. US renal showed echogenic kidney, consistent with chronic primary renal dz. Unknown Cr baseline  - Differential at this time is vast:  vasculitis, IgA nephropathy MPGN, SLE, Hepatitis, or drug reaction.   - Pt uses Fronto leaf with his marijuana, a tobacco base leaf for the last few months, no reports of kidney injury from use  - Mostly likely due to nephritic picture  - Monitor I/Os  -cont IVF  -kidney biopsy today  -f/u biopsy results, utox, ASLO, VASYL - Pt is presenting with fevers, joint pain and swelling. UA showed moderate blood and >600 protein, Cr of 1.66. UA only had 0-2 RBCs, more consistent with rhabdomyolysis, but CK was 96. US renal showed echogenic kidney, consistent with chronic primary renal dz. Unknown Cr baseline. Spot urine protein 1700 mg/dl, prealbumin 12.  - Differential at this time is vast:  vasculitis, IgA nephropathy MPGN, SLE, Hepatitis, or drug reaction.  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

## 2017-07-27 NOTE — PROGRESS NOTE ADULT - PROBLEM SELECTOR PLAN 4
- IMPROVE score of 0   - SCDs if needed, pt ambulating - IMPROVE score of 0   - SCDs if needed, pt ambulating  -Dispo: medically optimized. Kidney biopsy performed, will f/u with biops pathology. Pt tolerated PO diet. Ready to be discharged with no home needs.

## 2017-07-27 NOTE — PROGRESS NOTE ADULT - PROBLEM SELECTOR PLAN 1
GIFTY in the setting of nephrotic range proteinuria (7g). Patient will benefit from renal biopsy. Case discussed at length with patient and Medical attending. Patients Scr increased to 1.82 today. Continue to monitor BMP. Patient may follow up biopsy results in office. Please avoid NSAIDs, RCA's and nephrotoxins GIFTY in the setting of nephrotic range proteinuria (7g). Renal biopsy completed 7/27. Case discussed at length with patient and Medical attending. Patients Scr remains elevated. Continue to monitor BMP. Patient may follow up biopsy results in office. Please avoid NSAIDs, RCA's and nephrotoxins

## 2017-07-27 NOTE — PROGRESS NOTE ADULT - SUBJECTIVE AND OBJECTIVE BOX
Patient is a 37y old  Male who presents with a chief complaint of fever (24 Jul 2017 13:00)      SUBJECTIVE / OVERNIGHT EVENTS:  -      MEDICATIONS  (STANDING):  calcium carbonate 500 mG (Tums) Chewable 2 Tablet(s) Chew once    MEDICATIONS  (PRN):  acetaminophen   Tablet 650 milliGRAM(s) Oral every 6 hours PRN For Temp greater than 38 C (100.4 F)  acetaminophen   Tablet. 650 milliGRAM(s) Oral every 6 hours PRN Mild and Moderate Pain  ondansetron Injectable 4 milliGRAM(s) IV Push every 8 hours PRN Nausea and/or Vomiting        Vital Signs Last 24 Hrs  T(C): 37.1 (26 Jul 2017 06:56), Max: 37.1 (25 Jul 2017 21:44)  T(F): 98.7 (26 Jul 2017 06:56), Max: 98.7 (25 Jul 2017 21:44)  HR: 58 (26 Jul 2017 06:56) (50 - 60)  BP: 116/62 (26 Jul 2017 06:56) (108/62 - 116/62)  BP(mean): --  RR: 18 (26 Jul 2017 06:56) (18 - 18)  SpO2: 98% (26 Jul 2017 06:56) (96% - 100%)    CAPILLARY BLOOD GLUCOSE    I&O's Summary      PHYSICAL EXAM:  GENERAL: NAD, well-groomed, well-developed  HEAD:  Atraumatic, Normocephalic  EYES: EOMI, PERRLA, conjunctiva and sclera clear  ENMT: No tonsillar erythema, exudates, or enlargement; Moist mucous membranes, Good dentition, No lesions  NECK: Supple, No JVD  CHEST/LUNG: Clear to percussion bilaterally; No rales, rubs. +occasional S4  ABDOMEN: Soft, Nontender, Nondistended; Bowel sounds present. No CVA tenderness   VASCULAR:  2+ Peripheral Pulses, No clubbing, cyanosis, or edema.   MSK: No joint effusions or tenderness. NROM  SKIN: No rashes, petechiae, or lesions  NERVOUS SYSTEM:  Alert & Oriented X3, fluent speech      LABS:                        11.7   4.55  )-----------( 199      ( 26 Jul 2017 06:26 )             34.2     26 Jul 2017 06:26    139    |  108    |  22     ----------------------------<  92     4.0     |  23     |  1.82     Ca    7.6        26 Jul 2017 06:26 (corrected 9.2)  Phos  3.2       26 Jul 2017 06:26  Mg     2.1       26 Jul 2017 06:26    PT/INR - ( 26 Jul 2017 06:26 )   PT: 10.7 SEC;   INR: 0.96       spot urine protein 1,777 mg/dl  spot urine Cr 246 mg/dl  C3 88 dec, CRP inc, ESR inc  UA moderate blood, >600 protein, 0-2 RBCs    pANCA, cANCA, RF, anti-ds DNA ab neg      RADIOLOGY & ADDITIONAL TESTS:      MICROBIOLOGY  BCx 7/24 neg    CONSULTS: nephrology Patient is a 37y old  Male who presents with a chief complaint of fever (24 Jul 2017 13:00)      SUBJECTIVE / OVERNIGHT EVENTS: No new complaints, feel better, tolerated diet.      MEDICATIONS  (STANDING):  calcium carbonate 500 mG (Tums) Chewable 2 Tablet(s) Chew once    MEDICATIONS  (PRN):  acetaminophen   Tablet 650 milliGRAM(s) Oral every 6 hours PRN For Temp greater than 38 C (100.4 F)  acetaminophen   Tablet. 650 milliGRAM(s) Oral every 6 hours PRN Mild and Moderate Pain  ondansetron Injectable 4 milliGRAM(s) IV Push every 8 hours PRN Nausea and/or Vomiting        Vital Signs Last 24 Hrs  T(C): 37.1 (26 Jul 2017 06:56), Max: 37.1 (25 Jul 2017 21:44)  T(F): 98.7 (26 Jul 2017 06:56), Max: 98.7 (25 Jul 2017 21:44)  HR: 58 (26 Jul 2017 06:56) (50 - 60)  BP: 116/62 (26 Jul 2017 06:56) (108/62 - 116/62)  BP(mean): --  RR: 18 (26 Jul 2017 06:56) (18 - 18)  SpO2: 98% (26 Jul 2017 06:56) (96% - 100%)    CAPILLARY BLOOD GLUCOSE    I&O's Summary      PHYSICAL EXAM:  GENERAL: NAD, well-groomed, well-developed  HEAD:  Atraumatic, Normocephalic  EYES: EOMI, PERRLA, conjunctiva and sclera clear  ENMT: No tonsillar erythema, exudates, or enlargement; Moist mucous membranes, Good dentition, No lesions  NECK: Supple, No JVD  CHEST/LUNG: Clear to percussion bilaterally; No rales, rubs. +occasional S4  ABDOMEN: Soft, Nontender, Nondistended; Bowel sounds present. No CVA tenderness   VASCULAR:  2+ Peripheral Pulses, No clubbing, cyanosis, or edema.   MSK: No joint effusions or tenderness. NROM  SKIN: No rashes, petechiae, or lesions  NERVOUS SYSTEM:  Alert & Oriented X3, fluent speech      LABS:                        11.7   4.55  )-----------( 199      ( 26 Jul 2017 06:26 )             34.2     26 Jul 2017 06:26    139    |  108    |  22     ----------------------------<  92     4.0     |  23     |  1.82     Ca    7.6        26 Jul 2017 06:26 (corrected 9.2)  Phos  3.2       26 Jul 2017 06:26  Mg     2.1       26 Jul 2017 06:26    PT/INR - ( 26 Jul 2017 06:26 )   PT: 10.7 SEC;   INR: 0.96       spot urine protein 1,777 mg/dl  spot urine Cr 246 mg/dl  C3 88 dec, CRP inc, ESR inc  UA moderate blood, >600 protein, 0-2 RBCs    pANCA, cANCA, RF, anti-ds DNA ab neg      RADIOLOGY & ADDITIONAL TESTS:      MICROBIOLOGY  BCx 7/24 neg    CONSULTS: nephrology Patient is a 37y old  Male who presents with a chief complaint of fever (24 Jul 2017 13:00)      SUBJECTIVE / OVERNIGHT EVENTS: No new complaints, feel better, tolerated diet.    MEDICATIONS  (STANDING):  calcium carbonate 500 mG (Tums) Chewable 2 Tablet(s) Chew once    MEDICATIONS  (PRN):  acetaminophen   Tablet 650 milliGRAM(s) Oral every 6 hours PRN For Temp greater than 38 C (100.4 F)  acetaminophen   Tablet. 650 milliGRAM(s) Oral every 6 hours PRN Mild and Moderate Pain  ondansetron Injectable 4 milliGRAM(s) IV Push every 8 hours PRN Nausea and/or Vomiting      Vital Signs Last 24 Hrs  T(C): 37.2 (27 Jul 2017 06:06), Max: 37.2 (27 Jul 2017 06:06)  T(F): 98.9 (27 Jul 2017 06:06), Max: 98.9 (27 Jul 2017 06:06)  HR: 54 (27 Jul 2017 06:06) (53 - 54)  BP: 113/76 (27 Jul 2017 06:06) (113/76 - 115/72)  BP(mean): --  RR: 18 (27 Jul 2017 06:06) (18 - 18)  SpO2: 98% (27 Jul 2017 06:06) (98% - 99%)    CAPILLARY BLOOD GLUCOSE    I&O's Summary      PHYSICAL EXAM:  GENERAL: NAD, well-groomed, well-developed  HEAD:  Atraumatic, Normocephalic  EYES: EOMI, PERRLA, conjunctiva and sclera clear  ENMT: No tonsillar erythema, exudates, or enlargement; Moist mucous membranes, Good dentition, No lesions  NECK: Supple, No JVD  CHEST/LUNG: Clear to percussion bilaterally; No rales, rubs. +occasional S4  ABDOMEN: Soft, Nontender, Nondistended; Bowel sounds present. No CVA tenderness, no swelling around L kidney bx site, gauze c/d/i   VASCULAR:  2+ Peripheral Pulses, No clubbing, cyanosis, or edema.   MSK: No joint effusions or tenderness. NROM  SKIN: No rashes, petechiae, or lesions  NERVOUS SYSTEM:  Alert & Oriented X3, fluent speech      LABS:                        11.3   5.29  )-----------( 201      ( 27 Jul 2017 05:57 )             33.4     27 Jul 2017 05:57    138    |  106    |  23     ----------------------------<  100    4.1     |  23     |  1.77     Ca    7.7        27 Jul 2017 05:57  Phos  3.1       27 Jul 2017 05:57  Mg     2.0       27 Jul 2017 05:57      PT/INR - ( 26 Jul 2017 06:26 )   PT: 10.7 SEC;   INR: 0.96       spot urine protein 1,777 mg/dl  spot urine Cr 246 mg/dl  C3 88 dec, CRP inc, ESR inc  UA moderate blood, >600 protein, 0-2 RBCs    pANCA, cANCA, RF, anti-streptolysin O ab, anti-ds DNA ab neg      RADIOLOGY & ADDITIONAL TESTS:      MICROBIOLOGY  BCx 7/24 ng    CONSULTS: nephrology

## 2017-07-27 NOTE — DISCHARGE NOTE ADULT - PLAN OF CARE
Workup Resolved Your fever and joint pain resolved. You didn't have any more fevers while in the hospital. These symptoms were likely because of the medical condition in your kidneys. Your urinalysis and blood cultures didn't show an infection. Please see your PCP in 1-2 weeks for further management and treatment. Your urine studies showed you have protein through your kidneys. The ultrasound of your kidneys showed this has probably been happening for a while. Your fever and joint pain resolved which were likely related to the process going on in your kidneys. You had a kidney biopsy and the pathology results will tell us what is happening. Try to drink ensure and eat and drink enough to increase your nutrition and make up for the protein being lost in your urine.  Please make an appointment with your nephrologist, Dr. Elmo Collazo [phone]), within 1-2 weeks of leaving the hospital for the kidney biopsy results and further treatment. Monitor The ultrasound of your kidneys incidentally found a gallstone in your gallbaldder. Please followup with your PCP in 1-2 weeks for further management. Your EKG of your heart incidentally showed a 1st degree AV block. You did not experience any chest pain, palpitations, or loss of consciousness. Please see your PCP within 1-2 weeks for further workup and treatment.

## 2017-07-27 NOTE — PROGRESS NOTE ADULT - ASSESSMENT
37 y.o. male with no PMHx presenting with fever, polyarticular pain, GIFTY being worked up for nephritic/nephrotic syndrome.  At this time the differential is vast with possible causes are SLE, IgA nephropathy, vasculitis,  MPGN, Hepatitis, or drug reaction. 37 y.o. male with no PMHx presenting with fever, polyarticular pain, GIFTY being worked up for nephrotic-range proteinuria.  At this time the differential is vast with possible causes are SLE, IgA nephropathy, vasculitis, MPGN, or drug reaction.

## 2017-07-27 NOTE — DISCHARGE NOTE ADULT - PATIENT PORTAL LINK FT
“You can access the [facility] Patient Portal, offered by [facility], by registering with the following website: [email or web address]”

## 2017-07-27 NOTE — PROGRESS NOTE ADULT - PROBLEM SELECTOR PROBLEM 4
Calculus of gallbladder without cholecystitis without obstruction
Need for prophylactic measure
Need for prophylactic measure

## 2017-07-27 NOTE — DISCHARGE NOTE ADULT - ADDITIONAL INSTRUCTIONS
Please follow-up with Dr. Elmo Collazo [phone]), nephrology, within 1-2 weeks of leaving the hospital for the kidney biopsy results and further treatment.

## 2017-07-27 NOTE — PROGRESS NOTE ADULT - PROBLEM SELECTOR PROBLEM 3
Fever, unspecified fever cause
Calculus of gallbladder without cholecystitis without obstruction
Calculus of gallbladder without cholecystitis without obstruction

## 2017-07-27 NOTE — PROGRESS NOTE ADULT - SUBJECTIVE AND OBJECTIVE BOX
[facility] DIVISION OF KIDNEY DISEASES AND HYPERTENSION -- FOLLOW UP NOTE  --------------------------------------------------------------------------------    HPI: 38 yo male with no PMHx admitted with fever x 2 days, polyarticular pain, 20 lb weight loss. Patient being seen for GIFTY concerning for nephritic/nephrotic syndrome. Patient seen at bedside today. Patient has no complaints today. Patient denies CP, SOB and LE edema.       PAST HISTORY  --------------------------------------------------------------------------------  No significant changes to PMH, PSH, FHx, SHx, unless otherwise noted    ALLERGIES & MEDICATIONS  --------------------------------------------------------------------------------  Allergies    All fruits (Blisters)  No Known Drug Allergies    Intolerances      Standing Inpatient Medications  sodium chloride 0.9%. 1000 milliLiter(s) IV Continuous <Continuous>  calcium carbonate 500 mG (Tums) Chewable 2 Tablet(s) Chew once    PRN Inpatient Medications  acetaminophen   Tablet 650 milliGRAM(s) Oral every 6 hours PRN  acetaminophen   Tablet. 650 milliGRAM(s) Oral every 6 hours PRN  ondansetron Injectable 4 milliGRAM(s) IV Push every 8 hours PRN      REVIEW OF SYSTEMS  --------------------------------------------------------------------------------  Gen: No weakness  Skin: No rashes  Head/Eyes/Ears/Mouth: No headache  Respiratory: No dyspnea  CV: No chest pain  GI: No abdominal pain  : No increased frequency  MSK: No edema  Neuro: No dizziness/lightheadedness    All other systems were reviewed and are negative, except as noted.    VITALS/PHYSICAL EXAM  --------------------------------------------------------------------------------  T(C): 37.1 (07-26-17 @ 06:56), Max: 37.1 (07-25-17 @ 21:44)  HR: 58 (07-26-17 @ 06:56) (50 - 60)  BP: 116/62 (07-26-17 @ 06:56) (108/62 - 116/62)  RR: 18 (07-26-17 @ 06:56) (18 - 18)  SpO2: 98% (07-26-17 @ 06:56) (96% - 100%)  Wt(kg): --  Height (cm): 193.04 (07-24-17 @ 16:39)  Weight (kg): 83.9 (07-24-17 @ 16:39)  BMI (kg/m2): 22.5 (07-24-17 @ 16:39)  BSA (m2): 2.14 (07-24-17 @ 16:39)      07-25-17 @ 07:01  -  07-26-17 @ 07:00  --------------------------------------------------------  IN: 1200 mL / OUT: 0 mL / NET: 1200 mL      Physical Exam:  	Gen: NAD, well-appearing  	HEENT: Anicteric   	Pulm: CTA B/L  	CV: RRR, S1S2  	Abd: +BS, soft, nontender/nondistended  	MSK: Warm, no edema  	Neuro: No focal deficits  	Psych: Normal affect       LABS/STUDIES  --------------------------------------------------------------------------------              11.7   4.55  >-----------<  199      [07-26-17 @ 06:26]              34.2     139  |  108  |  22  ----------------------------<  92      [07-26-17 @ 06:26]  4.0   |  23  |  1.82        Ca     7.6     [07-26-17 @ 06:26]      Mg     2.1     [07-26-17 @ 06:26]      Phos  3.2     [07-26-17 @ 06:26]    TPro  5.0  /  Alb  2.3  /  TBili  0.5  /  DBili  x   /  AST  22  /  ALT  21  /  AlkPhos  51  [07-25-17 @ 06:00]    PT/INR: PT 10.7 , INR 0.96       [07-26-17 @ 06:26]  PTT: 34.5       [07-26-17 @ 06:26]      Creatinine Trend:  SCr 1.82 [07-26 @ 06:26]  SCr 1.61 [07-25 @ 06:00]  SCr 1.66 [07-24 @ 06:20]    Urinalysis - [07-24-17 @ 08:05]      Color YELLOW / Appearance CLEAR / SG 1.039 / pH 6.5      Gluc NEGATIVE / Ketone NEGATIVE  / Bili NEGATIVE / Urobili NORMAL       Blood MODERATE / Protein >600 / Leuk Est NEGATIVE / Nitrite NEGATIVE      RBC 0-2 / WBC 5-10 / Hyaline 5-10 / Gran 10-25 / Sq Epi OCC / Non Sq Epi  / Bacteria FEW    Urine Creatinine 246.36      [07-25-17 @ 18:50]  Urine Protein > 200      [07-25-17 @ 18:50]    HbA1c 6.1      [07-24-17 @ 17:30]  TSH 1.10      [07-24-17 @ 06:20]  Lipid: chol 109, , HDL 21, LDL 68      [07-26-17 @ 06:26]    HBsAg NEGATIVE      [07-26-17 @ 06:26]    dsDNA <12      [07-24-17 @ 17:30]  C3 Complement 88.1      [07-24-17 @ 17:30]  C4 Complement 13.2      [07-24-17 @ 17:30]  Rheumatoid Factor 7.5      [07-24-17 @ 17:30]  ANCA: cANCA Negative, pANCA Negative, atypical ANCA --      [07-24-17 @ 17:30] [facility] DIVISION OF KIDNEY DISEASES AND HYPERTENSION -- FOLLOW UP NOTE  --------------------------------------------------------------------------------    HPI: 38 yo male with no PMHx admitted with fever x 2 days, polyarticular pain, 20 lb weight loss. Patient being seen for GIFTY concerning for nephritic/nephrotic syndrome. Patient seen at bedside today. Patient reports persistently unable to tolerate PO. Patient endorses nausea, no vomiting, no abdominal pain, no heartburn, no dysphagia, or pain with swallowing. Patient reports concern for losing weight. Patient denies CP, SOB and LE edema.       PAST HISTORY  --------------------------------------------------------------------------------  No significant changes to PMH, PSH, FHx, SHx, unless otherwise noted    ALLERGIES & MEDICATIONS  --------------------------------------------------------------------------------  Allergies    All fruits (Blisters)  No Known Drug Allergies    Intolerances      Standing Inpatient Medications  sodium chloride 0.9%. 1000 milliLiter(s) IV Continuous <Continuous>  calcium carbonate 500 mG (Tums) Chewable 2 Tablet(s) Chew once    PRN Inpatient Medications  acetaminophen   Tablet 650 milliGRAM(s) Oral every 6 hours PRN  acetaminophen   Tablet. 650 milliGRAM(s) Oral every 6 hours PRN  ondansetron Injectable 4 milliGRAM(s) IV Push every 8 hours PRN      REVIEW OF SYSTEMS  --------------------------------------------------------------------------------  Gen: No weakness  Skin: No rashes  Head/Eyes/Ears/Mouth: No headache  Respiratory: No dyspnea  CV: No chest pain  GI: No abdominal pain  : No increased frequency  MSK: No edema  Neuro: No dizziness/lightheadedness    All other systems were reviewed and are negative, except as noted.    Basic Metabolic Panel w/Mg &amp; Inorg Phos (07.27.17 @ 05:57)    eGFR if : 55 mL/min    eGFR if Non : 48: The units for eGFR are ml/min/1.73m2 (normalized body  surface area). The eGFR is calculated from a serum  creatinine using the CKD-EPI equation. Other variables  required for calculation are race, age and sex. Among  patients with chronic kidney dise48: ase (CKD), the eGFR is  useful in determining the stage of disease according to  KDOQI CKD classification. All eGFR results are reported  numerically with the following interpretation.    GFR  (ml/min/1.73 m2)          W/KIDNEY DAMAGE    W/O KIDNEY DM48: G  ==========================================================  >= 90.......................Stage 1..............Normal  60-89.......................Stage 2...........Decreased GFR  30-59.......................Stage 3..............Stage 3  15-29.......48: ................Stage 4..............Stage 4  < 15........................Stage 5..............Stage 5    Each stage of CKD assumes that the associated GFR level  has been in effect for at least 3 months. Determination of  stages one and two (with eGF48: R > 59ml/min/m2) requires  estimation of kidney damage for at least 3 months as  defined by structural or functional abnormalities.    Limitations: All estimates of GFR will be less accurate  for patients at extremes of muscle mass (including but  not48:  limited to frail elderly, critically ill, or cancer  patients), those with unusual diets, and those with  conditions associated with reduced secretion or  extrarenal elimination of creatinine. The eGFR equation  is not recommended for use in [id number]:  with unstable  creatinine levels. mL/min    Phosphorus Level, Serum: 3.1 mg/dL    Calcium, Total Serum: 7.7 mg/dL    Sodium, Serum: 138 mmol/L    Potassium, Serum: 4.1 mmol/L    Chloride, Serum: 106 mmol/L    Carbon Dioxide, Serum: 23 mmol/L    Blood Urea Nitrogen, Serum: 23 mg/dL    Creatinine, Serum: 1.77 mg/dL    Glucose, Serum: 100 mg/dL    Magnesium, Serum: 2.0 mg/dL        Physical Exam:  	Gen: NAD, well-appearing  	HEENT: Anicteric   	Pulm: CTA B/L  	CV: RRR, S1S2  	Abd: +BS, soft, nontender/nondistended  	MSK: Warm, no edema  	Neuro: No focal deficits  	Psych: Normal affect       LABS/STUDIES  --------------------------------------------------------------------------------              11.7   4.55  >-----------<  199      [07-26-17 @ 06:26]              34.2     139  |  108  |  22  ----------------------------<  92      [07-26-17 @ 06:26]  4.0   |  23  |  1.82        Ca     7.6     [07-26-17 @ 06:26]      Mg     2.1     [07-26-17 @ 06:26]      Phos  3.2     [07-26-17 @ 06:26]    TPro  5.0  /  Alb  2.3  /  TBili  0.5  /  DBili  x   /  AST  22  /  ALT  21  /  AlkPhos  51  [07-25-17 @ 06:00]    PT/INR: PT 10.7 , INR 0.96       [07-26-17 @ 06:26]  PTT: 34.5       [07-26-17 @ 06:26]      Creatinine Trend:  SCr 1.82 [07-26 @ 06:26]  SCr 1.61 [07-25 @ 06:00]  SCr 1.66 [07-24 @ 06:20]    Urinalysis - [07-24-17 @ 08:05]      Color YELLOW / Appearance CLEAR / SG 1.039 / pH 6.5      Gluc NEGATIVE / Ketone NEGATIVE  / Bili NEGATIVE / Urobili NORMAL       Blood MODERATE / Protein >600 / Leuk Est NEGATIVE / Nitrite NEGATIVE      RBC 0-2 / WBC 5-10 / Hyaline 5-10 / Gran 10-25 / Sq Epi OCC / Non Sq Epi  / Bacteria FEW    Urine Creatinine 246.36      [07-25-17 @ 18:50]  Urine Protein > 200      [07-25-17 @ 18:50]    HbA1c 6.1      [07-24-17 @ 17:30]  TSH 1.10      [07-24-17 @ 06:20]  Lipid: chol 109, , HDL 21, LDL 68      [07-26-17 @ 06:26]    HBsAg NEGATIVE      [07-26-17 @ 06:26]    dsDNA <12      [07-24-17 @ 17:30]  C3 Complement 88.1      [07-24-17 @ 17:30]  C4 Complement 13.2      [07-24-17 @ 17:30]  Rheumatoid Factor 7.5      [07-24-17 @ 17:30]  ANCA: cANCA Negative, pANCA Negative, atypical ANCA --      [07-24-17 @ 17:30]

## 2017-07-27 NOTE — PROGRESS NOTE ADULT - ATTENDING COMMENTS
Case discussed at length with primary team.  No renal objections to discharge.  He will follow up with me in the office.
Medically stable for discharge with renal f/u as an outpatient. Spoke to pt at length about f/u issues, he understood and agreed with the plan.  D/W Renal consult, Dr Collazo will f/u pt for further w/u and management of his nephrotic/nephritic process.
D/W Renal consult, will obtain renal biopsy for diagnosis as per renal rec.
for renal biopsy today, possible d/c tomorrow.
The patient likely has nephrotic syndrome although no edema seems to be apparent.  I recommend work up as above.  Patient will need kidney biopsy for GIFTY with proteinuria.  Plan discussed with Dr. Dunbar.

## 2017-07-27 NOTE — PROGRESS NOTE ADULT - PROBLEM SELECTOR PROBLEM 2
GIFTY (acute kidney injury)
Fever, unspecified fever cause
Fever, unspecified fever cause
Nephrotic syndrome
Nephrotic syndrome

## 2017-07-27 NOTE — DISCHARGE NOTE ADULT - HOSPITAL COURSE
The patient presented to the ED on July 24, 2017 complaining of fever and joint pain. His vitals in the ED were max temp 99.6F, HR 50-72bpm, //65, RR 14-17, SpO2 %. His hemoglobin was low at 12.2 and hematocrit was low at 36.3. His BUN was 18 and his creatinine was elevated at 1.66, baseline creatinine is unknown. His calcium was low at 8.3, serum protein was low at 5.6, serum albumin was low at 2.9. Liver function tests, thyroid stimulating hormone, creatine kinase, rheumatoid factor quantity, and double stranded DNA antibody were at normal levels. c-ANCA and p-ANCA were negative. Quantitative IgA, IgM, and IgG were normal. HIV Ag and Ab were negative. On urinalysis, there was moderate blood (RBC 0-2, WBC 5-10) and elevated protein at >600. Blood cultures have had no growth. Renal ultrasound showed bilateral echogenic kidneys and cholelithiasis. Chest xray showed clear lungs. EKG showed 1st degree AV block and sinus bradycardia. Nephrology consult recommended renal biopsy. In the ED he received 1L of fluid.   On 7/25, he complained of nausea when trying to eat so he was given 1L of normal saline and ondansetron 4mg. His hemoglobin and hematocrit were stable. His BUN was 18 and creatinine was high at 1.61, his calcium was low at 7.8, his protein was low at 5.0, and his albumin was low at 2.3. His hemoglobin A1C was elevated at 6.1, C3 complement was low at 88.1, C4 complement was 13.2, C-reactive protein was high at 7.2. His urine protein was 1777 mg/dL and his urine creatinine was 246.36.   On 7/26, he went for percutaneous renal biopsy without complications with interventional radiology. His hemoglobin and hematocrit were stable and the incision site didn't not show hematoma or ecchymosis. His BUN was 22 and creatinine was high at 1.82, chloride was high at 108, calcium was low at 7.6, cholesterol was low at 109, triglycerides was 123, HDL cholesterol was low at 21, and direct LDL was 68. PT, INR, and aPTT were normal. Hepatitis B surface antigen was negative. Nephrology, Dr. Collazo, advised that patient follow up biopsy results in office 1-2 weeks and that he avoids NSAIDs, RCAs, and other nephrotoxins.

## 2017-07-27 NOTE — PROGRESS NOTE ADULT - ASSESSMENT
36 yo M with no PMHx admitted for fever x 2 days, polyarticular pain, 20 lb weight loss found to have GIFTY concerning for nephritic/nephrotic syndrome.

## 2017-07-27 NOTE — DISCHARGE NOTE ADULT - CARE PLAN
Principal Discharge DX:	Proteinuria  Goal:	Workup  Instructions for follow-up, activity and diet:	Your urine studies showed you have protein through your kidneys. The ultrasound of your kidneys showed this has probably been happening for a while. Your fever and joint pain resolved which were likely related to the process going on in your kidneys. You had a kidney biopsy and the pathology results will tell us what is happening. Try to drink ensure and eat and drink enough to increase your nutrition and make up for the protein being lost in your urine.  Please make an appointment with your nephrologist, Dr. Elmo Collazo [phone]), within 1-2 weeks of leaving the hospital for the kidney biopsy results and further treatment.  Secondary Diagnosis:	Fever, unspecified fever cause  Goal:	Resolved  Instructions for follow-up, activity and diet:	Your fever and joint pain resolved. You didn't have any more fevers while in the hospital. These symptoms were likely because of the medical condition in your kidneys. Your urinalysis and blood cultures didn't show an infection. Please see your PCP in 1-2 weeks for further management and treatment.  Secondary Diagnosis:	Calculus of gallbladder without cholecystitis without obstruction  Goal:	Monitor  Instructions for follow-up, activity and diet:	The ultrasound of your kidneys incidentally found a gallstone in your gallbaldder. Please followup with your PCP in 1-2 weeks for further management.  Secondary Diagnosis:	AV block, 1st degree  Goal:	Monitor  Instructions for follow-up, activity and diet:	Your EKG of your heart incidentally showed a 1st degree AV block. You did not experience any chest pain, palpitations, or loss of consciousness. Please see your PCP within 1-2 weeks for further workup and treatment. Principal Discharge DX:	Proteinuria  Goal:	Workup  Instructions for follow-up, activity and diet:	Your urine studies showed you have protein through your kidneys. The ultrasound of your kidneys showed this has probably been happening for a while. Your fever and joint pain resolved which were likely related to the process going on in your kidneys. You had a kidney biopsy and the pathology results will tell us what is happening. Try to drink ensure and eat and drink enough to increase your nutrition and make up for the protein being lost in your urine.  Please make an appointment with your nephrologist, Dr. Elmo Collazo [phone]), within 1-2 weeks of leaving the hospital for the kidney biopsy results and further treatment.  Secondary Diagnosis:	Fever, unspecified fever cause  Goal:	Resolved  Instructions for follow-up, activity and diet:	Your fever and joint pain resolved. You didn't have any more fevers while in the hospital. These symptoms were likely because of the medical condition in your kidneys. Your urinalysis and blood cultures didn't show an infection. Please see your PCP in 1-2 weeks for further management and treatment.  Secondary Diagnosis:	Calculus of gallbladder without cholecystitis without obstruction  Goal:	Monitor  Instructions for follow-up, activity and diet:	The ultrasound of your kidneys incidentally found a gallstone in your gallbaldder. Please followup with your PCP in 1-2 weeks for further management.  Secondary Diagnosis:	AV block, 1st degree  Goal:	Monitor  Instructions for follow-up, activity and diet:	Your EKG of your heart incidentally showed a 1st degree AV block. You did not experience any chest pain, palpitations, or loss of consciousness. Please see your PCP within 1-2 weeks for further workup and treatment.

## 2017-07-28 LAB
ANA PAT FLD IF-IMP: SIGNIFICANT CHANGE UP
ANA TITR SER: SIGNIFICANT CHANGE UP
GAS PNL BLDMV: SIGNIFICANT CHANGE UP
KAPPA FREE LIGHT CHAINS, SERUM: 5.61 MG/DL — HIGH (ref 0.33–1.94)
LAMBDA FREE LIGHT CHAINS, SERUM: 4.16 MG/DL — HIGH (ref 0.57–2.63)

## 2017-07-29 ENCOUNTER — INPATIENT (INPATIENT)
Facility: HOSPITAL | Age: 37
LOS: 4 days | Discharge: HOME | DRG: 699 | End: 2017-08-03
Attending: INTERNAL MEDICINE | Admitting: INTERNAL MEDICINE
Payer: COMMERCIAL

## 2017-07-29 VITALS
OXYGEN SATURATION: 97 % | HEART RATE: 78 BPM | RESPIRATION RATE: 18 BRPM | SYSTOLIC BLOOD PRESSURE: 121 MMHG | DIASTOLIC BLOOD PRESSURE: 58 MMHG | TEMPERATURE: 99 F

## 2017-07-29 DIAGNOSIS — N04.9 NEPHROTIC SYNDROME WITH UNSPECIFIED MORPHOLOGIC CHANGES: ICD-10-CM

## 2017-07-29 DIAGNOSIS — Z98.890 OTHER SPECIFIED POSTPROCEDURAL STATES: Chronic | ICD-10-CM

## 2017-07-29 DIAGNOSIS — N05.1 UNSPECIFIED NEPHRITIC SYNDROME WITH FOCAL AND SEGMENTAL GLOMERULAR LESIONS: ICD-10-CM

## 2017-07-29 DIAGNOSIS — B34.3 PARVOVIRUS INFECTION, UNSPECIFIED: ICD-10-CM

## 2017-07-29 DIAGNOSIS — E87.5 HYPERKALEMIA: ICD-10-CM

## 2017-07-29 DIAGNOSIS — S21.302D: Chronic | ICD-10-CM

## 2017-07-29 LAB
ALBUMIN SERPL ELPH-MCNC: 2.2 G/DL
ALBUMIN SERPL ELPH-MCNC: 2.3 G/DL — LOW (ref 3.3–5)
ALP SERPL-CCNC: 67 U/L — SIGNIFICANT CHANGE UP (ref 40–120)
ALT FLD-CCNC: 36 U/L RC — SIGNIFICANT CHANGE UP (ref 10–45)
ANION GAP SERPL CALC-SCNC: 11 MMOL/L — SIGNIFICANT CHANGE UP (ref 5–17)
ANION GAP SERPL CALC-SCNC: 15 MMOL/L
ANION GAP SERPL CALC-SCNC: 6 MMOL/L — SIGNIFICANT CHANGE UP (ref 5–17)
AST SERPL-CCNC: 33 U/L — SIGNIFICANT CHANGE UP (ref 10–40)
BACTERIA BLD CULT: SIGNIFICANT CHANGE UP
BACTERIA BLD CULT: SIGNIFICANT CHANGE UP
BASE EXCESS BLDV CALC-SCNC: 1.7 MMOL/L — SIGNIFICANT CHANGE UP (ref -2–2)
BASOPHILS # BLD AUTO: 0.1 K/UL — SIGNIFICANT CHANGE UP (ref 0–0.2)
BASOPHILS NFR BLD AUTO: 1.6 % — SIGNIFICANT CHANGE UP (ref 0–2)
BILIRUB SERPL-MCNC: 0.2 MG/DL — SIGNIFICANT CHANGE UP (ref 0.2–1.2)
BUN SERPL-MCNC: 29 MG/DL
BUN SERPL-MCNC: 36 MG/DL — HIGH (ref 7–23)
BUN SERPL-MCNC: 36 MG/DL — HIGH (ref 7–23)
CA-I SERPL-SCNC: 1.19 MMOL/L — SIGNIFICANT CHANGE UP (ref 1.12–1.3)
CALCIUM SERPL-MCNC: 7.4 MG/DL
CALCIUM SERPL-MCNC: 7.5 MG/DL — LOW (ref 8.4–10.5)
CALCIUM SERPL-MCNC: 7.9 MG/DL — LOW (ref 8.4–10.5)
CHLORIDE BLDV-SCNC: 106 MMOL/L — SIGNIFICANT CHANGE UP (ref 96–108)
CHLORIDE SERPL-SCNC: 102 MMOL/L
CHLORIDE SERPL-SCNC: 106 MMOL/L — SIGNIFICANT CHANGE UP (ref 96–108)
CHLORIDE SERPL-SCNC: 107 MMOL/L — SIGNIFICANT CHANGE UP (ref 96–108)
CO2 BLDV-SCNC: 30 MMOL/L — SIGNIFICANT CHANGE UP (ref 22–30)
CO2 SERPL-SCNC: 23 MMOL/L
CO2 SERPL-SCNC: 23 MMOL/L — SIGNIFICANT CHANGE UP (ref 22–31)
CO2 SERPL-SCNC: 25 MMOL/L — SIGNIFICANT CHANGE UP (ref 22–31)
CREAT SERPL-MCNC: 2.15 MG/DL
CREAT SERPL-MCNC: 2.2 MG/DL — HIGH (ref 0.5–1.3)
CREAT SERPL-MCNC: 2.29 MG/DL — HIGH (ref 0.5–1.3)
EOSINOPHIL # BLD AUTO: 0.1 K/UL — SIGNIFICANT CHANGE UP (ref 0–0.5)
EOSINOPHIL NFR BLD AUTO: 2.3 % — SIGNIFICANT CHANGE UP (ref 0–6)
GAS PNL BLDV: 136 MMOL/L — SIGNIFICANT CHANGE UP (ref 136–145)
GAS PNL BLDV: SIGNIFICANT CHANGE UP
GAS PNL BLDV: SIGNIFICANT CHANGE UP
GLUCOSE BLDV-MCNC: 86 MG/DL — SIGNIFICANT CHANGE UP (ref 70–99)
GLUCOSE SERPL-MCNC: 104 MG/DL — HIGH (ref 70–99)
GLUCOSE SERPL-MCNC: 64 MG/DL
GLUCOSE SERPL-MCNC: 82 MG/DL — SIGNIFICANT CHANGE UP (ref 70–99)
HCO3 BLDV-SCNC: 28 MMOL/L — SIGNIFICANT CHANGE UP (ref 21–29)
HCT VFR BLD CALC: 34.6 % — LOW (ref 39–50)
HCT VFR BLDA CALC: 41 % — SIGNIFICANT CHANGE UP (ref 39–50)
HGB BLD CALC-MCNC: 13.5 G/DL — SIGNIFICANT CHANGE UP (ref 13–17)
HGB BLD-MCNC: 11.5 G/DL — LOW (ref 13–17)
LACTATE BLDV-MCNC: 1.9 MMOL/L — SIGNIFICANT CHANGE UP (ref 0.7–2)
LYMPHOCYTES # BLD AUTO: 1.4 K/UL — SIGNIFICANT CHANGE UP (ref 1–3.3)
LYMPHOCYTES # BLD AUTO: 24 % — SIGNIFICANT CHANGE UP (ref 13–44)
MCHC RBC-ENTMCNC: 31.2 PG — SIGNIFICANT CHANGE UP (ref 27–34)
MCHC RBC-ENTMCNC: 33.1 GM/DL — SIGNIFICANT CHANGE UP (ref 32–36)
MCV RBC AUTO: 94.1 FL — SIGNIFICANT CHANGE UP (ref 80–100)
MONOCYTES # BLD AUTO: 0.6 K/UL — SIGNIFICANT CHANGE UP (ref 0–0.9)
MONOCYTES NFR BLD AUTO: 11.3 % — SIGNIFICANT CHANGE UP (ref 2–14)
NEUTROPHILS # BLD AUTO: 3.5 K/UL — SIGNIFICANT CHANGE UP (ref 1.8–7.4)
NEUTROPHILS NFR BLD AUTO: 60.8 % — SIGNIFICANT CHANGE UP (ref 43–77)
PCO2 BLDV: 56 MMHG — HIGH (ref 35–50)
PH BLDV: 7.33 — LOW (ref 7.35–7.45)
PHOSPHATE SERPL-MCNC: 3.7 MG/DL
PLATELET # BLD AUTO: 207 K/UL — SIGNIFICANT CHANGE UP (ref 150–400)
PO2 BLDV: 32 MMHG — SIGNIFICANT CHANGE UP (ref 25–45)
POTASSIUM BLDV-SCNC: 4.7 MMOL/L — SIGNIFICANT CHANGE UP (ref 3.5–5)
POTASSIUM SERPL-MCNC: 4.7 MMOL/L — SIGNIFICANT CHANGE UP (ref 3.5–5.3)
POTASSIUM SERPL-MCNC: 5.5 MMOL/L — HIGH (ref 3.5–5.3)
POTASSIUM SERPL-SCNC: 4.5 MMOL/L
POTASSIUM SERPL-SCNC: 4.7 MMOL/L — SIGNIFICANT CHANGE UP (ref 3.5–5.3)
POTASSIUM SERPL-SCNC: 5.5 MMOL/L — HIGH (ref 3.5–5.3)
PROT SERPL-MCNC: 5.1 G/DL — LOW (ref 6–8.3)
RBC # BLD: 3.68 M/UL — LOW (ref 4.2–5.8)
RBC # FLD: 11.9 % — SIGNIFICANT CHANGE UP (ref 10.3–14.5)
SAO2 % BLDV: 46 % — LOW (ref 67–88)
SODIUM SERPL-SCNC: 138 MMOL/L — SIGNIFICANT CHANGE UP (ref 135–145)
SODIUM SERPL-SCNC: 140 MMOL/L
SODIUM SERPL-SCNC: 140 MMOL/L — SIGNIFICANT CHANGE UP (ref 135–145)
WBC # BLD: 5.8 K/UL — SIGNIFICANT CHANGE UP (ref 3.8–10.5)
WBC # FLD AUTO: 5.8 K/UL — SIGNIFICANT CHANGE UP (ref 3.8–10.5)

## 2017-07-29 PROCEDURE — 99285 EMERGENCY DEPT VISIT HI MDM: CPT | Mod: 25

## 2017-07-29 PROCEDURE — 99223 1ST HOSP IP/OBS HIGH 75: CPT | Mod: GC

## 2017-07-29 PROCEDURE — 93010 ELECTROCARDIOGRAM REPORT: CPT | Mod: NC

## 2017-07-29 RX ORDER — FUROSEMIDE 40 MG
40 TABLET ORAL ONCE
Qty: 0 | Refills: 0 | Status: COMPLETED | OUTPATIENT
Start: 2017-07-29 | End: 2017-07-30

## 2017-07-29 RX ORDER — HEPARIN SODIUM 5000 [USP'U]/ML
5000 INJECTION INTRAVENOUS; SUBCUTANEOUS EVERY 8 HOURS
Qty: 0 | Refills: 0 | Status: DISCONTINUED | OUTPATIENT
Start: 2017-07-29 | End: 2017-08-03

## 2017-07-29 RX ORDER — ACETAMINOPHEN 500 MG
975 TABLET ORAL ONCE
Qty: 0 | Refills: 0 | Status: COMPLETED | OUTPATIENT
Start: 2017-07-29 | End: 2017-07-29

## 2017-07-29 RX ORDER — ACETAMINOPHEN 500 MG
650 TABLET ORAL ONCE
Qty: 0 | Refills: 0 | Status: COMPLETED | OUTPATIENT
Start: 2017-07-29 | End: 2017-07-29

## 2017-07-29 RX ORDER — DIPHENHYDRAMINE HCL 50 MG
50 CAPSULE ORAL ONCE
Qty: 0 | Refills: 0 | Status: COMPLETED | OUTPATIENT
Start: 2017-07-29 | End: 2017-07-29

## 2017-07-29 RX ORDER — IMMUNE GLOBULIN,GAMMA(IGG) 5 %
30 VIAL (ML) INTRAVENOUS ONCE
Qty: 0 | Refills: 0 | Status: COMPLETED | OUTPATIENT
Start: 2017-07-29 | End: 2017-07-29

## 2017-07-29 RX ADMIN — Medication 650 MILLIGRAM(S): at 17:39

## 2017-07-29 RX ADMIN — Medication 50 MILLIGRAM(S): at 17:39

## 2017-07-29 RX ADMIN — Medication 50 GRAM(S): at 18:24

## 2017-07-29 RX ADMIN — Medication 975 MILLIGRAM(S): at 15:04

## 2017-07-29 RX ADMIN — HEPARIN SODIUM 5000 UNIT(S): 5000 INJECTION INTRAVENOUS; SUBCUTANEOUS at 21:56

## 2017-07-29 NOTE — CONSULT NOTE ADULT - SUBJECTIVE AND OBJECTIVE BOX
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  Patient presented to [facility] last week for acute onset knee and elbow pain, edema, erythema and elevated creatinine.  His baseline is unclear.  Patient was found with positive parvovirus IgM, IgG and workup was otherwise negative (mildly decreased C3).  Patient had renal biopsy and discharged home.  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  Patient was sent into the hospital by nephrologist for treatment of parvovirus induced FSGS.  Patient reports that he started to experience diffuse edema that started about 3 days ago and has been rapidly worsening.  Pt endorses very foamy urine, but no blood in the urine.        PAST HISTORY  --------------------------------------------------------------------------------  PAST MEDICAL & SURGICAL HISTORY:  No pertinent past medical history  No significant past surgical history    FAMILY HISTORY:  No pertinent family history in first degree relatives    PAST SOCIAL HISTORY:  smokes marijuana recreationally    ALLERGIES & MEDICATIONS  --------------------------------------------------------------------------------  Allergies    apple (Urticaria)  Bananas (Urticaria)  Grapes (Urticaria)  No Known Drug Allergies    Intolerances      Standing Inpatient Medications  immune globulin gamma IVPB 30 Gram(s) IV Intermittent once  acetaminophen   Tablet 650 milliGRAM(s) Oral once    PRN Inpatient Medications  diphenhydrAMINE   Capsule 50 milliGRAM(s) Oral once PRN      REVIEW OF SYSTEMS  --------------------------------------------------------------------------------  Gen: +fatigue, +malaise  Skin: erythema of knees and elbows previously  Head/Eyes/Ears/Mouth: +headache  Respiratory: No dyspnea, cough, wheezing, hemoptysis  CV: No chest pain, PND, orthopnea  GI: No abdominal pain, diarrhea  : +foamy urine  MSK: as per HPI  Neuro: +weakness, no seizures  Heme: No easy bruising or bleeding  Endo: No heat/cold intolerance      VITALS/PHYSICAL EXAM  --------------------------------------------------------------------------------  T(C): 36.9 (07-29-17 @ 16:37), Max: 37.3 (07-29-17 @ 11:53)  HR: 50 (07-29-17 @ 16:37) (50 - 78)  BP: 123/61 (07-29-17 @ 16:37) (120/60 - 144/63)  RR: 18 (07-29-17 @ 16:37) (18 - 18)  SpO2: 98% (07-29-17 @ 16:37) (97% - 99%)  Wt(kg): --  Height (cm): 193.04 (07-29-17 @ 16:37)  Weight (kg): 93.4 (07-29-17 @ 16:37)  BMI (kg/m2): 25.1 (07-29-17 @ 16:37)  BSA (m2): 2.24 (07-29-17 @ 16:37)      Physical Exam:  	Gen: NAD, well-appearing  	HEENT: PERRL, supple neck, clear oropharynx  	Pulm: CTA B/L  	CV: RRR, S1S2; no rub  	Back: No spinal or CVA tenderness; no sacral edema  	Abd: +BS, soft, nontender/nondistended  	: No suprapubic tenderness  	UE: 1+ pitting edema bilaterally  	LE: 1+ pitting edema bilaterally  	Neuro: No focal deficits, intact gait  	Psych: Normal affect and mood  	Skin: Warm, without rashes    LABS/STUDIES  --------------------------------------------------------------------------------              11.5   5.8   >-----------<  207      [07-29-17 @ 13:40]              34.6     140  |  106  |  36  ----------------------------<  82      [07-29-17 @ 13:40]  5.5   |  23  |  2.29        Ca     7.9     [07-29-17 @ 13:40]    TPro  5.1  /  Alb  2.3  /  TBili  0.2  /  DBili  x   /  AST  33  /  ALT  36  /  AlkPhos  67  [07-29-17 @ 13:40]

## 2017-07-29 NOTE — H&P ADULT - HISTORY OF PRESENT ILLNESS
Pt is a 36 yo M who presented to the ED on July 24, 2017 complaining of fever and joint pain. His vitals in the ED were max temp 99.6F, HR 50-72bpm, //65, RR 14-17, SpO2 %. His hemoglobin was low at 12.2 and hematocrit was low at 36.3. His BUN was 18 and his creatinine was elevated at 1.66, baseline creatinine is unknown. His calcium was low at 8.3, serum protein was low at 5.6, serum albumin was low at 2.9. Liver function tests, thyroid stimulating hormone, creatine kinase, rheumatoid factor quantity, and double stranded DNA antibody were at normal levels. c-ANCA and p-ANCA were negative. Quantitative IgA, IgM, and IgG were normal. HIV Ag and Ab were negative. On urinalysis, there was moderate blood (RBC 0-2, WBC 5-10) and elevated protein at >600. Blood cultures have had no growth. Renal ultrasound showed bilateral echogenic kidneys and cholelithiasis. Chest xray showed clear lungs. EKG showed 1st degree AV block and sinus bradycardia. Nephrology consult recommended renal biopsy. In the ED he received 1L of fluid.   On 7/25, he complained of nausea when trying to eat so he was given 1L of normal saline and ondansetron 4mg. His hemoglobin and hematocrit were stable. His BUN was 18 and creatinine was high at 1.61, his calcium was low at 7.8, his protein was low at 5.0, and his albumin was low at 2.3. His hemoglobin A1C was elevated at 6.1, C3 complement was low at 88.1, C4 complement was 13.2, C-reactive protein was high at 7.2. His urine protein was 1777 mg/dL and his urine creatinine was 246.36.   On 7/26, he went for percutaneous renal biopsy without complications with interventional radiology. His hemoglobin and hematocrit were stable and the incision site didn't not show hematoma or ecchymosis. His BUN was 22 and creatinine was high at 1.82, chloride was high at 108, calcium was low at 7.6, cholesterol was low at 109, triglycerides was 123, HDL cholesterol was low at 21, and direct LDL was 68. PT, INR, and aPTT were normal. Hepatitis B surface antigen was negative. Nephrology, Dr. Collazo, advised that patient follow up biopsy results in office 1-2 weeks and that he avoids NSAIDs, RCAs, and other nephrotoxins. Pt is a 38 yo M w/ no significant PMH who presented to [facility] ED on 7/24 with fever and joint pain. Pt also complained that he's been having frothy urine. Vitals were stable and he had no other complaints. His labs were notable for mild anemia and low serum albumin, low serum protein, and elevated Cr, CRP. UA with elevated protein and Cr. Renal US showed echogenic kidneys b/l and cholelithiasis. 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 Pt was then told to come to [facility] for IVIG infusions. He endorses abdominal and lower extremity edema. He denies CP, SOB, dizziness, lightheadedness, palpitations, SOB, abd pain, N/V, focal weaknesses.

## 2017-07-29 NOTE — H&P ADULT - NSHPPHYSICALEXAM_GEN_ALL_CORE
General: NAD, well nourished, appears stated age  HEENT:  Head: NT, AT  Eyes: EOMI, scleara non-icteric, conjunctiva clear, PERRLA, visual fields full to confrontration   Ears: hearing intact b/l  Nose: no discharge, obstruction, non-deviated  Mouth: no exudates, injected in pharynx, uvula midlines   Neck: Supple, No JVD, no carotid bruits no lymphadenopathy, no thyroidomegaly  Cardiac: RRR, S1 S2, No M/R/G  Pulmonary: CTA b/l, Breathing unlabored, No Rhonchi/Rales/Wheezing, diaphragm moves symmetrically b/l  Abdomen: Soft, Non -tender, +BS, no hepatomegaly or splenomegaly  Back: no CVA tenderness  Extremities: Warm, nontender, No Rashes, lesions, bruises, +trace pitting edema LE b/l  Neuro: AO3, No focal deficits, CNII-CNXII grossly intact

## 2017-07-29 NOTE — H&P ADULT - PROBLEM SELECTOR PLAN 2
- ID c/s - Normocytic Anemia  - ID c/s - Normocytic Anemia  - ID c/s in AM - Normocytic Anemia  - ID c/s in AM for recs.

## 2017-07-29 NOTE — ED PROVIDER NOTE - ATTENDING CONTRIBUTION TO CARE
Attending MD Ivey. Agree with above.  Pt is an otherwise healthy 37 yr old male presenting to ED reporting that he had a recent stay at [facility] where he was dxed with parvovirus and txed and discharged with nephrology follow-up following a renal biopsy.  Pt received a call from Dr. Collazo (nephro) last night stating that he had FSGS and 'concerning findings' on bx.  Pt states that he feels well but has noted bilateral LE, scrotal and penile swelling recently.  No SOB, CP.  No flank pain.

## 2017-07-29 NOTE — H&P ADULT - ASSESSMENT
Pt is a 38 yo M who is admitted for IVIG infusions after being diagnosed by biopsy with FSGS 2/2 parvovirus at [facility]. Pt is a 36 yo M who is admitted for IVIG infusions after being diagnosed by biopsy with FSGS 2/2 parvovirus at [facility].

## 2017-07-29 NOTE — ED PROVIDER NOTE - PHYSICAL EXAMINATION
Juana Connors M.D.:   patient awake alert NAD .   LUNGS CTAB no wheeze no crackle.   CARD RRR no m/r/g.    Abdomen soft NT ND no rebound no guarding no CVA tenderness.   EXT WWP b/l LE edema up to hips. no calf tenderness CV 2+DP/PT bilaterally.   neuro A&Ox3 gait normal.    skin warm and dry no rash  HEENT: moist mucous membranes, PERRL, EOMI

## 2017-07-29 NOTE — H&P ADULT - PROBLEM SELECTOR PLAN 4
Mildly elevated K  - bradycardia to high 50s  - check EKG Mildly elevated K due to hemolysis  - bradycardia to high 50s  - check EKG  - recheck BMP in AM

## 2017-07-29 NOTE — H&P ADULT - NSHPSOCIALHISTORY_GEN_ALL_CORE
, lives in [place].  Smokes cigars, marijuana. No cigarettes, occasional ETOH, no other illicits , lives in [place].  Smokes cigars, marijuana. No cigarettes, occasional ETOH, no other illicits.

## 2017-07-29 NOTE — ED PROVIDER NOTE - MEDICAL DECISION MAKING DETAILS
Juana Connors M.D: pt presenting for admission for worsening renal function in setting of bx diagnosed FSGS. pt only symptom currently is b/l LE edema. will send labs, discuss with renal and admit for further management-IVIG

## 2017-07-29 NOTE — CONSULT NOTE ADULT - PROBLEM SELECTOR RECOMMENDATION 9
Pending Parvovirus PCR from previous admission at [facility] (patient has a split record in EMR).  If patient does not have parvo viremia then will pulse dose steroids.  Start IVIG 400mg/kg daily for 5 days for a total of 2 grams / kg  Will premedicate with tylenol and benadryl  Monitor renal function, urine output  No acute HD indications at this time

## 2017-07-29 NOTE — ED PROVIDER NOTE - PROGRESS NOTE DETAILS
Juana Connors M.D: spoke with nephrology. will come write consult, but pt needs to be admitted to hospitalist for IVIG

## 2017-07-29 NOTE — CONSULT NOTE ADULT - ASSESSMENT
Patient is a 38 y/o M w/ kidney biopsy proven collapsing FSGS, suspected due to parvovirus infection.

## 2017-07-29 NOTE — ED PROVIDER NOTE - NS ED MD TWO NIGHTS YN
Problem: Patient Care Overview  Goal: Plan of Care Review  Outcome: Ongoing (interventions implemented as appropriate)     Infant rooming in with mother throughout the shift. Feedings have improved throughout shift. Mother educated on need to burp infant after feeding, return demonstration completed. Bonding between mother and infant improved. VSS.  Voiding and stooling adequately. SUELLEN scoring discontinued by Dr. Hunt this am.            Yes

## 2017-07-29 NOTE — H&P ADULT - NSHPREVIEWOFSYSTEMS_GEN_ALL_CORE
REVIEW OF SYSTEMS:    CONSTITUTIONAL: No weakness, fatigue, malaise, fevers or chills  EYES: No visual changes; No double vision, No vertigo, eye pain  Ears: no otalgia, no otorrhea, no hearing loss, tinnitus  Nose: no epistaxis, rhinorrhea, post-discharge, sinus pressure  Throat: no throat pain, no oral lesions, tooth pain   NECK: No pain or stiffness  RESPIRATORY: No cough (productive or dry), wheezing, hemoptysis; No shortness of breath, orthnopnea, LENNON  CARDIOVASCULAR: No chest pain or palpitations, no leg edema, no claudication    GASTROINTESTINAL: No abdominal or epigastric pain. No nausea, vomiting, or hematemesis; No diarrhea or constipation. No melena or hematochezia.  GENITOURINARY: +foamy urine; No dysuria, frequency, urgency or hematuria, no pelvic pain, urinary incontinence, urgency  Muscloskeletal: no joints or muscle pain, no swelling in joints or muscles  NEUROLOGICAL: No numbness or weakness, headache, memory loss, seizures, dizziness, vertigo, syncope, ataxia  SKIN: No pruritis, rashes, lesions or new moles  Psych: No anxiety, sadness, insomnia, suicide thoughts  Endocrine: No Heat or Cold intolerance, polydipsia, polyphagia  Heme/Lymph: no LN enlargement, no easy bruising or bleeding

## 2017-07-29 NOTE — H&P ADULT - PROBLEM SELECTOR PLAN 1
Renal biopsy on 7/26    - IVIG infusions day 1/5  - avoid nephrotoxins; renally dose meds Renal biopsy on 7/26   - IVIG infusions day 1/5  - avoid nephrotoxins; renally dose meds Renal biopsy on 7/26 w/ evidence of FSGS  - IVIG infusions day 1/5  - avoid nephrotoxins; renally dose meds

## 2017-07-29 NOTE — ED ADULT NURSE NOTE - OBJECTIVE STATEMENT
37 yr old male amb to ed c/o recent d/c from hosp for pos parviris with kidney abnorm Pt awake alert and orientedx3 Denies n/v Denies chest pain or sob Pt had increased protein in urine Bilat leg and scrotal swelling.Spouse at bedside Admitted to hosp last sunday d/c Thursday Afebril Denies chills/fever n/v

## 2017-07-29 NOTE — CONSULT NOTE ADULT - PROBLEM SELECTOR RECOMMENDATION 2
From FSGS with volume overload  Would give 40mg IV lasix x 1 dose tonight and monitor response  Escalate diuretics daily and if patient does have significant urine output then will consider adding IV albumin

## 2017-07-29 NOTE — ED PROVIDER NOTE - OBJECTIVE STATEMENT
Juana Connors M.D: 37yoM   sunday morning p/w HA- labs note blood and protein in urine. stayed in hospital. further tests including bx show FSGS. discharged from hospital yesterday. called back in today 2/2 parvovirus found in blood. now with b/l LE and penile swelling. no f/c +HA. Juana Connors M.D: 37yoM   sunday morning p/w HA- labs note blood and protein in urine. stayed in hospital. further tests including bx show FSGS. discharged from hospital yesterday. called back in today 2/2 parvovirus found in blood. now with b/l LE and penile swelling. no f/c +HA.  PMD: Dan Ross

## 2017-07-29 NOTE — H&P ADULT - NSHPLABSRESULTS_GEN_ALL_CORE
11.5   5.8   )-----------( 207      ( 29 Jul 2017 13:40 )             34.6       07-29    140  |  106  |  36<H>  ----------------------------<  82  5.5<H>   |  23  |  2.29<H>    Ca    7.9<L>      29 Jul 2017 13:40    TPro  5.1<L>  /  Alb  2.3<L>  /  TBili  0.2  /  DBili  x   /  AST  33  /  ALT  36  /  AlkPhos  67  07-29 11.5   5.8   )-----------( 207      ( 29 Jul 2017 13:40 )             34.6       07-29    140  |  106  |  36<H>  ----------------------------<  82  5.5<H>   |  23  |  2.29<H>    Ca    7.9<L>      29 Jul 2017 13:40    TPro  5.1<L>  /  Alb  2.3<L>  /  TBili  0.2  /  DBili  x   /  AST  33  /  ALT  36  /  AlkPhos  67  07-29      EXAM:  IR PROCEDURE        PROCEDURE DATE:  Jul 27 2017         INTERPRETATION:  Procedure: Ultrasound guided Left kidney biopsy.    Operators: ERNST Muñoz    Clinical Information: 37-year-old male with fevers, joint pain, and   proteinuria. Concern for nephrotic syndrome.     Technique: Informed consent obtained. The patient was placed prone on the   CT table. The left flank was prepped and draped in the usual sterile   fashion. Timeout performed. 1% lidocaine used for local anesthesia.    Under ultrasound guidance, a 17-gauge needle was advanced percutaneously   into the left kidney lower pole. Several 18-gauge core biopsies were   obtained. The needles were removed and hemostasis was achieved utilizing   Gelfoam pledgets. A dry sterile gauze was applied. No complications.    Sedation: As per anesthesia.    Impression: Successful ultrasound biopsy of the left kidney.      EXAM:  US KIDNEY(S)        *** ADDENDUM 07/27/2017  ***    Addendum: Clinical information should include: Possible nephrotic   syndrome.      *** END OF ADDENDUM 07/27/2017  ***      PROCEDURE DATE:  Jul 24 2017         INTERPRETATION:  CLINICAL INFORMATION: 37-year-old male with headache,   decrease appetite, and swollen joints. Possible nephrotic syndrome.    COMPARISON: None available.    TECHNIQUE: Sonography of the kidneys and bladder.     FINDINGS:    Right kidney:  14.4 x 5.7 x 7.0 cm. No renal mass, hydronephrosis or   calculi. Echogenic parenchyma with increased cortical medullary   distinction.    Left kidney:  15.5 x 7.0 cm. No renal mass, hydronephrosis or calculi.   Echogenic parenchyma with increased cortical medullary distinction.    Urinary bladder: Within normal limits.    Gallbladder: Mobile 1.0 cm gallstone incidentally noted.    IMPRESSION:     Bilateral echogenic kidneys, compatible with medical renal disease. No   hydronephrosis.    Cholelithiasis.

## 2017-07-29 NOTE — H&P ADULT - ATTENDING COMMENTS
38 yo man admitted by renal for treatment of parvovirus induced FSGS.  Dc'ed from [facility] after full workup including renal biopsy.  Cr now worse at 2.2.  Seen by renal and started on IVIG.  ID to be called in AM tomorrow per renal request to see if any other treatment is necessary for the parvo.  Giving lasix per renal request as well for mild volume overload.

## 2017-07-29 NOTE — ED PROVIDER NOTE - CROS ED [facility] ALL NEG
Pt not placed on monitor at this time  Will be moved to monitor bed when one available  Charge nurse aware denies any pain or discomfort   negative...

## 2017-07-30 LAB
ALBUMIN SERPL ELPH-MCNC: 2.2 G/DL — LOW (ref 3.3–5)
ALP SERPL-CCNC: 66 U/L — SIGNIFICANT CHANGE UP (ref 40–120)
ALT FLD-CCNC: 31 U/L — SIGNIFICANT CHANGE UP (ref 10–45)
ANION GAP SERPL CALC-SCNC: 14 MMOL/L — SIGNIFICANT CHANGE UP (ref 5–17)
APTT BLD: 37.2 SEC — SIGNIFICANT CHANGE UP (ref 27.5–37.4)
AST SERPL-CCNC: 27 U/L — SIGNIFICANT CHANGE UP (ref 10–40)
BASOPHILS # BLD AUTO: 0.06 K/UL — SIGNIFICANT CHANGE UP (ref 0–0.2)
BASOPHILS NFR BLD AUTO: 1.3 % — SIGNIFICANT CHANGE UP (ref 0–2)
BILIRUB SERPL-MCNC: 0.2 MG/DL — SIGNIFICANT CHANGE UP (ref 0.2–1.2)
BUN SERPL-MCNC: 37 MG/DL — HIGH (ref 7–23)
CALCIUM SERPL-MCNC: 8.2 MG/DL — LOW (ref 8.4–10.5)
CHLORIDE SERPL-SCNC: 105 MMOL/L — SIGNIFICANT CHANGE UP (ref 96–108)
CO2 SERPL-SCNC: 21 MMOL/L — LOW (ref 22–31)
CREAT SERPL-MCNC: 2.28 MG/DL — HIGH (ref 0.5–1.3)
EOSINOPHIL # BLD AUTO: 0.1 K/UL — SIGNIFICANT CHANGE UP (ref 0–0.5)
EOSINOPHIL NFR BLD AUTO: 2.2 % — SIGNIFICANT CHANGE UP (ref 0–6)
GLUCOSE SERPL-MCNC: 81 MG/DL — SIGNIFICANT CHANGE UP (ref 70–99)
HCT VFR BLD CALC: 32.3 % — LOW (ref 39–50)
HGB BLD-MCNC: 10.6 G/DL — LOW (ref 13–17)
IMM GRANULOCYTES NFR BLD AUTO: 0.2 % — SIGNIFICANT CHANGE UP (ref 0–1.5)
INR BLD: 0.96 RATIO — SIGNIFICANT CHANGE UP (ref 0.88–1.16)
LYMPHOCYTES # BLD AUTO: 1.3 K/UL — SIGNIFICANT CHANGE UP (ref 1–3.3)
LYMPHOCYTES # BLD AUTO: 28.1 % — SIGNIFICANT CHANGE UP (ref 13–44)
MAGNESIUM SERPL-MCNC: 2.2 MG/DL — SIGNIFICANT CHANGE UP (ref 1.6–2.6)
MCHC RBC-ENTMCNC: 28.7 PG — SIGNIFICANT CHANGE UP (ref 27–34)
MCHC RBC-ENTMCNC: 32.8 GM/DL — SIGNIFICANT CHANGE UP (ref 32–36)
MCV RBC AUTO: 87.5 FL — SIGNIFICANT CHANGE UP (ref 80–100)
MONOCYTES # BLD AUTO: 0.52 K/UL — SIGNIFICANT CHANGE UP (ref 0–0.9)
MONOCYTES NFR BLD AUTO: 11.3 % — SIGNIFICANT CHANGE UP (ref 2–14)
NEUTROPHILS # BLD AUTO: 2.63 K/UL — SIGNIFICANT CHANGE UP (ref 1.8–7.4)
NEUTROPHILS NFR BLD AUTO: 56.9 % — SIGNIFICANT CHANGE UP (ref 43–77)
PHOSPHATE SERPL-MCNC: 4 MG/DL — SIGNIFICANT CHANGE UP (ref 2.5–4.5)
PLATELET # BLD AUTO: 242 K/UL — SIGNIFICANT CHANGE UP (ref 150–400)
POTASSIUM SERPL-MCNC: 5.1 MMOL/L — SIGNIFICANT CHANGE UP (ref 3.5–5.3)
POTASSIUM SERPL-SCNC: 5.1 MMOL/L — SIGNIFICANT CHANGE UP (ref 3.5–5.3)
PROT SERPL-MCNC: 5.5 G/DL — LOW (ref 6–8.3)
PROTHROM AB SERPL-ACNC: 10.5 SEC — SIGNIFICANT CHANGE UP (ref 9.8–12.7)
RBC # BLD: 3.69 M/UL — LOW (ref 4.2–5.8)
RBC # FLD: 13.5 % — SIGNIFICANT CHANGE UP (ref 10.3–14.5)
SODIUM SERPL-SCNC: 140 MMOL/L — SIGNIFICANT CHANGE UP (ref 135–145)
WBC # BLD: 4.62 K/UL — SIGNIFICANT CHANGE UP (ref 3.8–10.5)
WBC # FLD AUTO: 4.62 K/UL — SIGNIFICANT CHANGE UP (ref 3.8–10.5)

## 2017-07-30 PROCEDURE — 99233 SBSQ HOSP IP/OBS HIGH 50: CPT | Mod: GC

## 2017-07-30 PROCEDURE — 99222 1ST HOSP IP/OBS MODERATE 55: CPT | Mod: GC

## 2017-07-30 RX ORDER — ACETAMINOPHEN 500 MG
650 TABLET ORAL ONCE
Qty: 0 | Refills: 0 | Status: COMPLETED | OUTPATIENT
Start: 2017-07-30

## 2017-07-30 RX ORDER — DIPHENHYDRAMINE HCL 50 MG
50 CAPSULE ORAL ONCE
Qty: 0 | Refills: 0 | Status: COMPLETED | OUTPATIENT
Start: 2017-07-30 | End: 2018-06-28

## 2017-07-30 RX ORDER — FUROSEMIDE 40 MG
40 TABLET ORAL DAILY
Qty: 0 | Refills: 0 | Status: DISCONTINUED | OUTPATIENT
Start: 2017-07-30 | End: 2017-08-03

## 2017-07-30 RX ORDER — DIPHENHYDRAMINE HCL 50 MG
50 CAPSULE ORAL ONCE
Qty: 0 | Refills: 0 | Status: COMPLETED | OUTPATIENT
Start: 2017-07-30 | End: 2017-07-30

## 2017-07-30 RX ORDER — ACETAMINOPHEN 500 MG
650 TABLET ORAL ONCE
Qty: 0 | Refills: 0 | Status: COMPLETED | OUTPATIENT
Start: 2017-07-30 | End: 2017-07-30

## 2017-07-30 RX ORDER — ACETAMINOPHEN 500 MG
650 TABLET ORAL ONCE
Qty: 0 | Refills: 0 | Status: DISCONTINUED | OUTPATIENT
Start: 2017-07-30 | End: 2017-07-30

## 2017-07-30 RX ORDER — IMMUNE GLOBULIN,GAMMA(IGG) 5 %
30 VIAL (ML) INTRAVENOUS ONCE
Qty: 0 | Refills: 0 | Status: COMPLETED | OUTPATIENT
Start: 2017-07-30 | End: 2017-07-30

## 2017-07-30 RX ADMIN — HEPARIN SODIUM 5000 UNIT(S): 5000 INJECTION INTRAVENOUS; SUBCUTANEOUS at 21:23

## 2017-07-30 RX ADMIN — Medication 50 GRAM(S): at 18:15

## 2017-07-30 RX ADMIN — Medication 50 MILLIGRAM(S): at 17:44

## 2017-07-30 RX ADMIN — Medication 40 MILLIGRAM(S): at 00:53

## 2017-07-30 RX ADMIN — HEPARIN SODIUM 5000 UNIT(S): 5000 INJECTION INTRAVENOUS; SUBCUTANEOUS at 05:27

## 2017-07-30 RX ADMIN — Medication 650 MILLIGRAM(S): at 17:44

## 2017-07-30 RX ADMIN — HEPARIN SODIUM 5000 UNIT(S): 5000 INJECTION INTRAVENOUS; SUBCUTANEOUS at 14:01

## 2017-07-30 NOTE — PROGRESS NOTE ADULT - SUBJECTIVE AND OBJECTIVE BOX
Patient is a 37y old  Male who presents with a chief complaint of Admission for IVIG infusion for FSGS (29 Jul 2017 17:39)        SUBJECTIVE / OVERNIGHT EVENTS: Pt doing well. Rik to 38 overnight. Normally 50s at rest. Asymptomatic and vitals stable. Former elite  with low baseline HR.      MEDICATIONS  (STANDING):  heparin  Injectable 5000 Unit(s) SubCutaneous every 8 hours    MEDICATIONS  (PRN):        CAPILLARY BLOOD GLUCOSE        I&O's Summary    29 Jul 2017 07:01  -  30 Jul 2017 07:00  --------------------------------------------------------  IN: 1360 mL / OUT: 1500 mL / NET: -140 mL        PHYSICAL EXAM  GENERAL: NAD, well-developed  HEAD:  Atraumatic, Normocephalic  EYES: EOMI, PERRLA, conjunctiva and sclera clear  NECK: Supple, No JVD  CHEST/LUNG: Clear to auscultation bilaterally; No wheeze  HEART: Regular rate and rhythm; No murmurs, rubs, or gallops  ABDOMEN: Soft, Nontender, Nondistended; Bowel sounds present  EXTREMITIES:  2+ Peripheral Pulses, No clubbing, cyanosis, or edema  PSYCH: AAOx3  SKIN: No rashes or lesions    LABS:                        10.6   4.62  )-----------( 242      ( 30 Jul 2017 08:39 )             32.3     07-29    138  |  107  |  36<H>  ----------------------------<  104<H>  4.7   |  25  |  2.20<H>    Ca    7.5<L>      29 Jul 2017 21:34    TPro  5.1<L>  /  Alb  2.3<L>  /  TBili  0.2  /  DBili  x   /  AST  33  /  ALT  36  /  AlkPhos  67  07-29              RADIOLOGY & ADDITIONAL TESTS:    Imaging Personally Reviewed:  Consultant(s) Notes Reviewed:    Care Discussed with Consultants/Other Providers: Patient is a 37y old  Male who presents with a chief complaint of Admission for IVIG infusion for FSGS (29 Jul 2017 17:39)        SUBJECTIVE / OVERNIGHT EVENTS: Pt doing well. Rik to 37 overnight. Normally 50s at rest. Asymptomatic and vitals stable. Former elite  with low baseline HR.      MEDICATIONS  (STANDING):  heparin  Injectable 5000 Unit(s) SubCutaneous every 8 hours    MEDICATIONS  (PRN):        CAPILLARY BLOOD GLUCOSE        I&O's Summary    29 Jul 2017 07:01  -  30 Jul 2017 07:00  --------------------------------------------------------  IN: 1360 mL / OUT: 1500 mL / NET: -140 mL        PHYSICAL EXAM  General: NAD, well nourished, appears stated age  HEENT:  Head: NT, AT  Eyes: EOMI, scleara non-icteric, conjunctiva clear, PERRLA, visual fields full to confrontration   Ears: hearing intact b/l  Nose: no discharge, obstruction, non-deviated  Mouth: no exudates, injected in pharynx, uvula midlines   Neck: Supple, No JVD, no carotid bruits no lymphadenopathy, no thyroidomegaly  Cardiac: RRR, S1 S2, No M/R/G  Pulmonary: CTA b/l, Breathing unlabored, No Rhonchi/Rales/Wheezing, diaphragm moves symmetrically b/l  Abdomen: Soft, Non -tender, +BS, no hepatomegaly or splenomegaly  Back: no CVA tenderness, no parasternal tenderness  Extremities: Warm, nontender, No Rashes, lesions, bruises, +trace pitting edema LE b/l  Neuro: AO3, No focal deficits, CNII-CNXII grossly intact    LABS:                        10.6   4.62  )-----------( 242      ( 30 Jul 2017 08:39 )             32.3     07-29    138  |  107  |  36<H>  ----------------------------<  104<H>  4.7   |  25  |  2.20<H>    Ca    7.5<L>      29 Jul 2017 21:34    TPro  5.1<L>  /  Alb  2.3<L>  /  TBili  0.2  /  DBili  x   /  AST  33  /  ALT  36  /  AlkPhos  67  07-29              RADIOLOGY & ADDITIONAL TESTS:    Imaging Personally Reviewed:  Consultant(s) Notes Reviewed:    Care Discussed with Consultants/Other Providers:

## 2017-07-30 NOTE — PROGRESS NOTE ADULT - PROBLEM SELECTOR PLAN 1
Patient tolerated first dose of IVIG well.  Continue IVIG 400mg / kg per day to complete a 5 day course for 2grams / kg total  Premedicate with tylenol and benadryl  Will monitor renal function

## 2017-07-30 NOTE — CONSULT NOTE ADULT - ASSESSMENT
36 y/o male transferred from [facility] for IVIG for FSGS sec to parvovirus. 38 y/o male here for IVIG for FSGS sec to parvovirus. 36 y/o male here for IVIG for Focal Segmental Glomerulosclerosis sec to parvovirus. Patient is not on immunosuppressives. Not much else to offer patient at this time from an infectious disease point of view.

## 2017-07-30 NOTE — PROGRESS NOTE ADULT - ATTENDING COMMENTS
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 This is likely associated in his case with an acute recent paro B19 infection ( igM positive in [facility] admission split record) and awaiting PCR.  Collapsing FSGs has been associated with many viruses classically being HIV, HTLV , CMV and parvo. Prognosis is usually not good given this is the worst type of FSGS with ATN. Literature on treatment of this specific cause is sparse and in the post renal transplant literature, IVIG has been tried and we are attempting that to salvage his kidney as otherwise there is high chance of progression to ESRD.  If PCR is neg, will consider steroids as well.    Start IVIG 400mg/kg daily for 5 days for a total of 2 grams / kg( got first dose yesterday)  Will premedicate with tylenol and benadryl  Monitor renal function, urine output  No acute HD indications at this time.  Cont lasix and feels better with it  ID eval in am tomorrow for any other alternate treatments for Parvo b19 if available.    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 This is likely associated in his case with an acute recent paro B19 infection ( igM positive in [facility] admission split record) and awaiting PCR.  Collapsing FSGs has been associated with many viruses classically being HIV, HTLV , CMV and parvo. Prognosis is usually not good given this is the worst type of FSGS with ATN. Literature on treatment of this specific cause is sparse and in the post renal transplant literature, IVIG has been tried and we are attempting that to salvage his kidney as otherwise there is high chance of progression to ESRD.  If PCR is neg, will consider steroids as well. In his case,hep b,c and HIV tests were neg. His c3 was low but that can be seen with acute parvo infections. His VASYL was mildly positive and light chains neg. His Ig levels were not low. 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     Start IVIG 400mg/kg daily for 5 days for a total of 2 grams / kg( got first dose yesterday)  Will premedicate with tylenol and benadryl  Monitor renal function, urine output  No acute HD indications at this time.  Cont lasix and feels better with it  ID eval in am tomorrow for any other alternate treatments for Parvo b19 if available.    http://cjasn.asnjournals.org/content/2/Supplement_1/S47.full ( a good review on this entity)    Deric Villatoro MD  Cell   Pager   Office
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
## 2017-07-30 NOTE — PROGRESS NOTE ADULT - PROBLEM SELECTOR PLAN 3
Pt with some LE edema  - serum alb 2.3, serum protein 5.1  - per renal  - lasix 40mg iv daily Pt with some LE edema  - serum albumin and protein is low  - per renal  - may need to put patient on full anticoagulation therapy.  - lasix 40mg iv daily

## 2017-07-30 NOTE — PROVIDER CONTACT NOTE (OTHER) - BACKGROUND
Pt came to [facility] 7/29 for FSGS to receive IVIG infusion.
Pt admitted with paravirus B 19 infection

## 2017-07-30 NOTE — PROGRESS NOTE ADULT - ASSESSMENT
Pt is a 36 yo M who is admitted for IVIG infusions after being diagnosed by biopsy with FSGS 2/2 parvovirus at [facility].

## 2017-07-30 NOTE — CONSULT NOTE ADULT - ATTENDING COMMENTS
Asked by Nepology to comment on treatment for Parvovirus B19 infection - in this case directly implicated in this patients' acute renal disease. Apart from reducing immunosuppressives when feasible and use of IVIG - just completed for this patient- no anti-viral therapy is available - confirmed with brief internet search.  Suggest: Check Parvovirus blood and urine by PCR. Screening labs anticipating possible future immunosuppressives.
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  Lasix 40mgs IV x 1.

## 2017-07-30 NOTE — PROGRESS NOTE ADULT - ATTENDING COMMENTS
Agree with above. For 2nd round of IVIg tonight at 6 PM. He's feeling well and tolerating it well, appreciate nephrology input and hope we could salvage his kidneys.

## 2017-07-30 NOTE — PROGRESS NOTE ADULT - PROBLEM SELECTOR PLAN 2
Excellent response to lasix yesterday  Would continue lasix 40mg IV once daily  Daily standing weights

## 2017-07-30 NOTE — PROGRESS NOTE ADULT - SUBJECTIVE AND OBJECTIVE BOX
[facility] DIVISION OF KIDNEY DISEASES AND HYPERTENSION -- FOLLOW UP NOTE  --------------------------------------------------------------------------------  Chief Complaint:  RPGN from collapsing FSGS    24 hour events/subjective:  Patient reports mild nausea which is tolerable and large volume urine output after lasix dose.  Denies any headaches, dizziness, pruritis, fevers, chills, chest pain, shortness of breath, abdominal pain, diarrhea.        PAST HISTORY  --------------------------------------------------------------------------------  No significant changes to PMH, PSH, FHx, SHx, unless otherwise noted    ALLERGIES & MEDICATIONS  --------------------------------------------------------------------------------  Allergies    apple (Urticaria)  Bananas (Urticaria)  Grapes (Urticaria)  No Known Drug Allergies  Orange (Urticaria)    Intolerances      Standing Inpatient Medications  heparin  Injectable 5000 Unit(s) SubCutaneous every 8 hours    PRN Inpatient Medications      REVIEW OF SYSTEMS  --------------------------------------------------------------------------------  Gen: No fevers/chills  Skin: No rashes  Head/Eyes/Ears/Mouth: No headache  Respiratory: No dyspnea  CV: No chest pain  GI: No abdominal pain, + mild nausea  : No dysuria  MSK: No edema  Neuro: No dizziness/lightheadedness    VITALS/PHYSICAL EXAM  --------------------------------------------------------------------------------  T(C): 36.7 (07-30-17 @ 04:56), Max: 37.4 (07-30-17 @ 00:42)  HR: 54 (07-30-17 @ 04:56) (41 - 78)  BP: 113/64 (07-30-17 @ 04:56) (113/55 - 154/80)  RR: 17 (07-30-17 @ 04:56) (15 - 18)  SpO2: 99% (07-30-17 @ 04:56) (97% - 100%)  Wt(kg): --  Height (cm): 193.04 (07-29-17 @ 16:37)  Weight (kg): 93.4 (07-29-17 @ 16:37)  BMI (kg/m2): 25.1 (07-29-17 @ 16:37)  BSA (m2): 2.24 (07-29-17 @ 16:37)      07-29-17 @ 07:01  -  07-30-17 @ 07:00  --------------------------------------------------------  IN: 1360 mL / OUT: 1500 mL / NET: -140 mL      Physical Exam:  	Gen: NAD, well-appearing  	HEENT: MMM  	Pulm: CTA B/L  	CV: RRR, S1S2; no rub  	Abd: +BS, soft, nontender/nondistended  	: No suprapubic tenderness  	UE:  no edema  	LE:  nonpitting edema  	Neuro: No focal deficits  	Psych: Normal affect and mood  	Skin: Warm    LABS/STUDIES  --------------------------------------------------------------------------------              10.6   4.62  >-----------<  242      [07-30-17 @ 08:39]              32.3     138  |  107  |  36  ----------------------------<  104      [07-29-17 @ 21:34]  4.7   |  25  |  2.20        Ca     7.5     [07-29-17 @ 21:34]    TPro  5.1  /  Alb  2.3  /  TBili  0.2  /  DBili  x   /  AST  33  /  ALT  36  /  AlkPhos  67  [07-29-17 @ 13:40]          Creatinine Trend:  SCr 2.20 [07-29 @ 21:34]  SCr 2.29 [07-29 @ 13:40]

## 2017-07-31 LAB
ALBUMIN SERPL ELPH-MCNC: 2 G/DL — LOW (ref 3.3–5)
ALP SERPL-CCNC: 62 U/L — SIGNIFICANT CHANGE UP (ref 40–120)
ALT FLD-CCNC: 28 U/L — SIGNIFICANT CHANGE UP (ref 10–45)
ANION GAP SERPL CALC-SCNC: 9 MMOL/L — SIGNIFICANT CHANGE UP (ref 5–17)
AST SERPL-CCNC: 22 U/L — SIGNIFICANT CHANGE UP (ref 10–40)
BASOPHILS # BLD AUTO: 0.07 K/UL — SIGNIFICANT CHANGE UP (ref 0–0.2)
BASOPHILS NFR BLD AUTO: 1.6 % — SIGNIFICANT CHANGE UP (ref 0–2)
BILIRUB SERPL-MCNC: 0.2 MG/DL — SIGNIFICANT CHANGE UP (ref 0.2–1.2)
BUN SERPL-MCNC: 34 MG/DL — HIGH (ref 7–23)
CALCIUM SERPL-MCNC: 8 MG/DL — LOW (ref 8.4–10.5)
CHLORIDE SERPL-SCNC: 105 MMOL/L — SIGNIFICANT CHANGE UP (ref 96–108)
CMV IGG FLD QL: 7.8 U/ML — HIGH
CMV IGG SERPL-IMP: POSITIVE
CMV IGM FLD-ACNC: 38.1 AU/ML — HIGH
CMV IGM SERPL QL: POSITIVE
CO2 SERPL-SCNC: 23 MMOL/L — SIGNIFICANT CHANGE UP (ref 22–31)
CREAT SERPL-MCNC: 1.93 MG/DL — HIGH (ref 0.5–1.3)
EBV EA AB SER IA-ACNC: 16.3 U/ML — HIGH
EBV EA AB TITR SER IF: POSITIVE
EBV EA IGG SER-ACNC: POSITIVE
EBV NA IGG SER IA-ACNC: 531 U/ML — HIGH
EBV PATRN SPEC IB-IMP: SIGNIFICANT CHANGE UP
EBV VCA IGG AVIDITY SER QL IA: POSITIVE
EBV VCA IGM SER IA-ACNC: 81.6 U/ML — HIGH
EBV VCA IGM SER IA-ACNC: >750 U/ML — HIGH
EBV VCA IGM TITR FLD: POSITIVE
EOSINOPHIL # BLD AUTO: 0.07 K/UL — SIGNIFICANT CHANGE UP (ref 0–0.5)
EOSINOPHIL NFR BLD AUTO: 1.6 % — SIGNIFICANT CHANGE UP (ref 0–6)
GLUCOSE SERPL-MCNC: 160 MG/DL — HIGH (ref 70–99)
HCT VFR BLD CALC: 31.2 % — LOW (ref 39–50)
HGB BLD-MCNC: 10.2 G/DL — LOW (ref 13–17)
IMM GRANULOCYTES NFR BLD AUTO: 0.2 % — SIGNIFICANT CHANGE UP (ref 0–1.5)
KAPPA/LAMBDA FREE LIGHT CHAIN RATIO, SERUM: 1.35 — SIGNIFICANT CHANGE UP
LYMPHOCYTES # BLD AUTO: 1.09 K/UL — SIGNIFICANT CHANGE UP (ref 1–3.3)
LYMPHOCYTES # BLD AUTO: 24.2 % — SIGNIFICANT CHANGE UP (ref 13–44)
MAGNESIUM SERPL-MCNC: 2.1 MG/DL — SIGNIFICANT CHANGE UP (ref 1.6–2.6)
MCHC RBC-ENTMCNC: 28.7 PG — SIGNIFICANT CHANGE UP (ref 27–34)
MCHC RBC-ENTMCNC: 32.7 GM/DL — SIGNIFICANT CHANGE UP (ref 32–36)
MCV RBC AUTO: 87.9 FL — SIGNIFICANT CHANGE UP (ref 80–100)
MONOCYTES # BLD AUTO: 0.69 K/UL — SIGNIFICANT CHANGE UP (ref 0–0.9)
MONOCYTES NFR BLD AUTO: 15.3 % — HIGH (ref 2–14)
NEUTROPHILS # BLD AUTO: 2.58 K/UL — SIGNIFICANT CHANGE UP (ref 1.8–7.4)
NEUTROPHILS NFR BLD AUTO: 57.1 % — SIGNIFICANT CHANGE UP (ref 43–77)
PHOSPHATE SERPL-MCNC: 4.1 MG/DL — SIGNIFICANT CHANGE UP (ref 2.5–4.5)
PLATELET # BLD AUTO: 247 K/UL — SIGNIFICANT CHANGE UP (ref 150–400)
POTASSIUM SERPL-MCNC: 4.7 MMOL/L — SIGNIFICANT CHANGE UP (ref 3.5–5.3)
POTASSIUM SERPL-SCNC: 4.7 MMOL/L — SIGNIFICANT CHANGE UP (ref 3.5–5.3)
PROT SERPL-MCNC: 5.8 G/DL — LOW (ref 6–8.3)
RBC # BLD: 3.55 M/UL — LOW (ref 4.2–5.8)
RBC # FLD: 13.6 % — SIGNIFICANT CHANGE UP (ref 10.3–14.5)
SODIUM SERPL-SCNC: 137 MMOL/L — SIGNIFICANT CHANGE UP (ref 135–145)
WBC # BLD: 4.51 K/UL — SIGNIFICANT CHANGE UP (ref 3.8–10.5)
WBC # FLD AUTO: 4.51 K/UL — SIGNIFICANT CHANGE UP (ref 3.8–10.5)

## 2017-07-31 PROCEDURE — 99233 SBSQ HOSP IP/OBS HIGH 50: CPT | Mod: GC

## 2017-07-31 PROCEDURE — 99232 SBSQ HOSP IP/OBS MODERATE 35: CPT | Mod: GC

## 2017-07-31 RX ORDER — IMMUNE GLOBULIN,GAMMA(IGG) 5 %
30 VIAL (ML) INTRAVENOUS ONCE
Qty: 0 | Refills: 0 | Status: COMPLETED | OUTPATIENT
Start: 2017-07-31 | End: 2017-07-31

## 2017-07-31 RX ORDER — ACETAMINOPHEN 500 MG
650 TABLET ORAL ONCE
Qty: 0 | Refills: 0 | Status: COMPLETED | OUTPATIENT
Start: 2017-07-31 | End: 2017-07-31

## 2017-07-31 RX ORDER — DIPHENHYDRAMINE HCL 50 MG
50 CAPSULE ORAL ONCE
Qty: 0 | Refills: 0 | Status: COMPLETED | OUTPATIENT
Start: 2017-07-31 | End: 2017-07-31

## 2017-07-31 RX ADMIN — HEPARIN SODIUM 5000 UNIT(S): 5000 INJECTION INTRAVENOUS; SUBCUTANEOUS at 05:57

## 2017-07-31 RX ADMIN — Medication 50 MILLIGRAM(S): at 17:26

## 2017-07-31 RX ADMIN — Medication 50 GRAM(S): at 18:00

## 2017-07-31 RX ADMIN — Medication 650 MILLIGRAM(S): at 17:26

## 2017-07-31 RX ADMIN — HEPARIN SODIUM 5000 UNIT(S): 5000 INJECTION INTRAVENOUS; SUBCUTANEOUS at 21:22

## 2017-07-31 RX ADMIN — HEPARIN SODIUM 5000 UNIT(S): 5000 INJECTION INTRAVENOUS; SUBCUTANEOUS at 13:11

## 2017-07-31 RX ADMIN — Medication 40 MILLIGRAM(S): at 05:57

## 2017-07-31 NOTE — PROGRESS NOTE ADULT - PROBLEM SELECTOR PLAN 1
Renal biopsy on 7/26 w/ evidence of FSGS  - IVIG infusions day 3/5  - avoid nephrotoxins; renally dose meds

## 2017-07-31 NOTE — PROGRESS NOTE ADULT - PROBLEM SELECTOR PLAN 1
- FSGS collapsing variant, s/p parvovirus infection  - Cr slightly improved today   - Hep panel and HIV negative  - pt w/ family Hx of renal dx  - IVIG 400mg / kg daily for total of 5 days for 2grams / kg total  - please premedicate with tylenol and benadryl  - f/u parvovirus PCR  - hold off on steroids at this time (would not give if active infection);    ID consult noted - possible role of any anti-virals?  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

## 2017-07-31 NOTE — PROGRESS NOTE ADULT - ATTENDING COMMENTS
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 This is likely associated in his case with an acute recent paro B19 infection ( igM positive in [facility] admission split record) and awaiting PCR.  Collapsing FSGs has been associated with many viruses classically being HIV, HTLV , CMV and parvo. Prognosis is usually not good given this is the worst type of FSGS with ATN. Literature on treatment of this specific cause is sparse and in the post renal transplant literature, IVIG has been tried and we are attempting that to salvage his kidney as otherwise there is high chance of progression to ESRD.  If PCR is neg, will consider steroids as well. In his case,hep b,c and HIV tests were neg. His c3 was low but that can be seen with acute parvo infections. His VASYL was mildly positive and light chains neg. His Ig levels were not low. 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     Start IVIG 400mg/kg daily ( 3/5 today)  No acute HD indications at this time.  Cont lasix and feels better with it  Appreciate VERITO grace    http://cjasn.asnjournals.org/content/2/Supplement_1/S47.full ( a good review on this entity)    Deric Villatoro MD  Cell   Pager   Office

## 2017-07-31 NOTE — PROGRESS NOTE ADULT - PROBLEM SELECTOR PLAN 2
- on lasix 40mg IV daily; w/ edema almost resolved  - dose lasix as necessary  - daily standing weights

## 2017-07-31 NOTE — PROGRESS NOTE ADULT - PROBLEM SELECTOR PLAN 3
Pt with some LE edema  - lasix 40mg iv daily  - serum albumin trending down. serum protein low.  - per renal  - may need to put patient on full anticoagulation therapy; will discuss with renal attending.

## 2017-07-31 NOTE — PROGRESS NOTE ADULT - ASSESSMENT
Pt is a 36 yo M who is admitted for IVIG infusions after being diagnosed by biopsy with FSGS 2/2 parvovirus at [facility].

## 2017-07-31 NOTE — PROGRESS NOTE ADULT - SUBJECTIVE AND OBJECTIVE BOX
Patient is a 37y old  Male who presents with a chief complaint of Admission for IVIG infusion for FSGS (29 Jul 2017 17:39)        SUBJECTIVE / OVERNIGHT EVENTS: Pt doing well. LE edema improving. No complaints      MEDICATIONS  (STANDING):  heparin  Injectable 5000 Unit(s) SubCutaneous every 8 hours  furosemide   Injectable 40 milliGRAM(s) IV Push daily  immune globulin gamma IVPB 30 Gram(s) IV Intermittent once    MEDICATIONS  (PRN):  acetaminophen   Tablet. 650 milliGRAM(s) Oral once PRN Given prior to IVIG treatment  diphenhydrAMINE   Capsule 50 milliGRAM(s) Oral once PRN Given prior to IVIG        CAPILLARY BLOOD GLUCOSE        I&O's Summary    30 Jul 2017 07:01  -  31 Jul 2017 07:00  --------------------------------------------------------  IN: 1620 mL / OUT: 0 mL / NET: 1620 mL    31 Jul 2017 07:01  -  31 Jul 2017 09:14  --------------------------------------------------------  IN: 480 mL / OUT: 1600 mL / NET: -1120 mL        PHYSICAL EXAM  General: NAD, well nourished, appears stated age  HEENT: NT, AT, EOMI, scleara non-icteric, conjunctiva clear, PERRLA, hearing intact b/l  Neck: Supple, No JVD  Cardiac: RRR, S1 S2, No M/R/G  Pulmonary: CTA b/l, Breathing unlabored, No Rhonchi/Rales/Wheezing, diaphragm moves symmetrically b/l  Abdomen: Soft, Non -tender, +BS, no hepatomegaly or splenomegaly  Back: no CVA tenderness, no parasternal tenderness  Extremities: Warm, nontender, No Rashes, lesions, bruises, +trace pitting edema LE b/l  Neuro: AO3, No focal deficits, CNII-CNXII grossly intact    LABS:                        10.2   4.51  )-----------( 247      ( 31 Jul 2017 07:11 )             31.2     07-31    137  |  105  |  34<H>  ----------------------------<  160<H>  4.7   |  23  |  1.93<H>    Ca    8.0<L>      31 Jul 2017 07:22  Phos  4.1     07-31  Mg     2.1     07-31    TPro  5.8<L>  /  Alb  2.0<L>  /  TBili  0.2  /  DBili  x   /  AST  22  /  ALT  28  /  AlkPhos  62  07-31    PT/INR - ( 30 Jul 2017 14:16 )   PT: 10.5 sec;   INR: 0.96 ratio         PTT - ( 30 Jul 2017 14:16 )  PTT:37.2 sec          RADIOLOGY & ADDITIONAL TESTS:    Imaging Personally Reviewed:  Consultant(s) Notes Reviewed:    Care Discussed with Consultants/Other Providers:

## 2017-08-01 ENCOUNTER — APPOINTMENT (OUTPATIENT)
Dept: NEPHROLOGY | Facility: CLINIC | Age: 37
End: 2017-08-01

## 2017-08-01 LAB
ALBUMIN SERPL ELPH-MCNC: 2 G/DL — LOW (ref 3.3–5)
ALP SERPL-CCNC: 61 U/L — SIGNIFICANT CHANGE UP (ref 40–120)
ALT FLD-CCNC: 37 U/L — SIGNIFICANT CHANGE UP (ref 10–45)
ANION GAP SERPL CALC-SCNC: 11 MMOL/L — SIGNIFICANT CHANGE UP (ref 5–17)
AST SERPL-CCNC: 32 U/L — SIGNIFICANT CHANGE UP (ref 10–40)
B19V DNA FLD QL NAA+PROBE: SIGNIFICANT CHANGE UP
BASOPHILS # BLD AUTO: 0.07 K/UL — SIGNIFICANT CHANGE UP (ref 0–0.2)
BASOPHILS NFR BLD AUTO: 1.6 % — SIGNIFICANT CHANGE UP (ref 0–2)
BILIRUB SERPL-MCNC: 0.2 MG/DL — SIGNIFICANT CHANGE UP (ref 0.2–1.2)
BUN SERPL-MCNC: 30 MG/DL — HIGH (ref 7–23)
CALCIUM SERPL-MCNC: 8 MG/DL — LOW (ref 8.4–10.5)
CHLORIDE SERPL-SCNC: 104 MMOL/L — SIGNIFICANT CHANGE UP (ref 96–108)
CO2 SERPL-SCNC: 23 MMOL/L — SIGNIFICANT CHANGE UP (ref 22–31)
CREAT ?TM UR-MCNC: 73 MG/DL — SIGNIFICANT CHANGE UP
CREAT SERPL-MCNC: 1.82 MG/DL — HIGH (ref 0.5–1.3)
EOSINOPHIL # BLD AUTO: 0.07 K/UL — SIGNIFICANT CHANGE UP (ref 0–0.5)
EOSINOPHIL NFR BLD AUTO: 1.6 % — SIGNIFICANT CHANGE UP (ref 0–6)
GLUCOSE SERPL-MCNC: 75 MG/DL — SIGNIFICANT CHANGE UP (ref 70–99)
HCT VFR BLD CALC: 31.3 % — LOW (ref 39–50)
HGB BLD-MCNC: 10.2 G/DL — LOW (ref 13–17)
IMM GRANULOCYTES NFR BLD AUTO: 0 % — SIGNIFICANT CHANGE UP (ref 0–1.5)
LYMPHOCYTES # BLD AUTO: 1.46 K/UL — SIGNIFICANT CHANGE UP (ref 1–3.3)
LYMPHOCYTES # BLD AUTO: 33.9 % — SIGNIFICANT CHANGE UP (ref 13–44)
M TB TUBERC IFN-G BLD QL: 0 IU/ML — SIGNIFICANT CHANGE UP
M TB TUBERC IFN-G BLD QL: 0.03 IU/ML — SIGNIFICANT CHANGE UP
M TB TUBERC IFN-G BLD QL: NEGATIVE — SIGNIFICANT CHANGE UP
MAGNESIUM SERPL-MCNC: 2.2 MG/DL — SIGNIFICANT CHANGE UP (ref 1.6–2.6)
MCHC RBC-ENTMCNC: 28.7 PG — SIGNIFICANT CHANGE UP (ref 27–34)
MCHC RBC-ENTMCNC: 32.6 GM/DL — SIGNIFICANT CHANGE UP (ref 32–36)
MCV RBC AUTO: 88.2 FL — SIGNIFICANT CHANGE UP (ref 80–100)
MITOGEN IGNF BCKGRD COR BLD-ACNC: 0.74 IU/ML — SIGNIFICANT CHANGE UP
MONOCYTES # BLD AUTO: 0.63 K/UL — SIGNIFICANT CHANGE UP (ref 0–0.9)
MONOCYTES NFR BLD AUTO: 14.6 % — HIGH (ref 2–14)
NEUTROPHILS # BLD AUTO: 2.08 K/UL — SIGNIFICANT CHANGE UP (ref 1.8–7.4)
NEUTROPHILS NFR BLD AUTO: 48.3 % — SIGNIFICANT CHANGE UP (ref 43–77)
PHOSPHATE SERPL-MCNC: 4.1 MG/DL — SIGNIFICANT CHANGE UP (ref 2.5–4.5)
PLATELET # BLD AUTO: 258 K/UL — SIGNIFICANT CHANGE UP (ref 150–400)
POTASSIUM SERPL-MCNC: 5 MMOL/L — SIGNIFICANT CHANGE UP (ref 3.5–5.3)
POTASSIUM SERPL-SCNC: 5 MMOL/L — SIGNIFICANT CHANGE UP (ref 3.5–5.3)
PROT ?TM UR-MCNC: 184 MG/DL — HIGH (ref 0–12)
PROT SERPL-MCNC: 5.9 G/DL — LOW (ref 6–8.3)
PROT/CREAT UR-RTO: 2.5 RATIO — HIGH (ref 0–0.2)
RBC # BLD: 3.55 M/UL — LOW (ref 4.2–5.8)
RBC # FLD: 13.4 % — SIGNIFICANT CHANGE UP (ref 10.3–14.5)
SODIUM SERPL-SCNC: 138 MMOL/L — SIGNIFICANT CHANGE UP (ref 135–145)
WBC # BLD: 4.31 K/UL — SIGNIFICANT CHANGE UP (ref 3.8–10.5)
WBC # FLD AUTO: 4.31 K/UL — SIGNIFICANT CHANGE UP (ref 3.8–10.5)

## 2017-08-01 PROCEDURE — 99232 SBSQ HOSP IP/OBS MODERATE 35: CPT | Mod: GC

## 2017-08-01 PROCEDURE — 99232 SBSQ HOSP IP/OBS MODERATE 35: CPT

## 2017-08-01 PROCEDURE — 99233 SBSQ HOSP IP/OBS HIGH 50: CPT

## 2017-08-01 RX ORDER — ACETAMINOPHEN 500 MG
650 TABLET ORAL EVERY 6 HOURS
Qty: 0 | Refills: 0 | Status: DISCONTINUED | OUTPATIENT
Start: 2017-08-01 | End: 2017-08-03

## 2017-08-01 RX ORDER — DIPHENHYDRAMINE HCL 50 MG
50 CAPSULE ORAL ONCE
Qty: 0 | Refills: 0 | Status: COMPLETED | OUTPATIENT
Start: 2017-08-01 | End: 2017-08-01

## 2017-08-01 RX ORDER — IMMUNE GLOBULIN,GAMMA(IGG) 5 %
30 VIAL (ML) INTRAVENOUS ONCE
Qty: 0 | Refills: 0 | Status: COMPLETED | OUTPATIENT
Start: 2017-08-01 | End: 2017-08-01

## 2017-08-01 RX ORDER — ACETAMINOPHEN 500 MG
650 TABLET ORAL ONCE
Qty: 0 | Refills: 0 | Status: COMPLETED | OUTPATIENT
Start: 2017-08-01 | End: 2017-08-01

## 2017-08-01 RX ADMIN — Medication 50 GRAM(S): at 18:15

## 2017-08-01 RX ADMIN — Medication 50 MILLIGRAM(S): at 17:35

## 2017-08-01 RX ADMIN — Medication 40 MILLIGRAM(S): at 05:31

## 2017-08-01 RX ADMIN — HEPARIN SODIUM 5000 UNIT(S): 5000 INJECTION INTRAVENOUS; SUBCUTANEOUS at 05:31

## 2017-08-01 RX ADMIN — HEPARIN SODIUM 5000 UNIT(S): 5000 INJECTION INTRAVENOUS; SUBCUTANEOUS at 21:10

## 2017-08-01 RX ADMIN — HEPARIN SODIUM 5000 UNIT(S): 5000 INJECTION INTRAVENOUS; SUBCUTANEOUS at 13:24

## 2017-08-01 RX ADMIN — Medication 650 MILLIGRAM(S): at 15:24

## 2017-08-01 RX ADMIN — Medication 650 MILLIGRAM(S): at 17:36

## 2017-08-01 RX ADMIN — Medication 650 MILLIGRAM(S): at 13:24

## 2017-08-01 NOTE — PROGRESS NOTE ADULT - PROBLEM SELECTOR PLAN 1
Renal biopsy on  w/ evidence of FSGS  - IVIG infusions day 4/  - avoid nephrotoxins; renally dose meds  -Creatinine improvin.29 on admission, 1.93 (17), 1.82 (17).  -Continue to trend CMP tomorrow AM Renal biopsy on  w/ evidence of FSGS  - IVIG infusions day 4/5  - avoid nephrotoxins; renally dose meds  -Creatinine improvin.29 on admission, 1.93 (17), 1.82 (17).  -Continue to trend CMP tomorrow AM  - EBV and CMV serologies positive for acute infections.  - Added PCR for EBV, CMV, HIV.   - Added T cell subset, Quantiferon Gold TB,

## 2017-08-01 NOTE — PROGRESS NOTE ADULT - SUBJECTIVE AND OBJECTIVE BOX
STACIA HAYES  37y  Male      Patient is a 37y old  Male who presents with a chief complaint of Admission for IVIG infusion for FSGS (29 Jul 2017 17:39)      INTERVAL HPI/OVERNIGHT EVENTS: No acute events overnight. Patient reports feeling well, continues to have frothy urine. He reports improvement in his LE edema, though thinks he may have some slight abdominal swelling still.    T(C): 37 (08-01-17 @ 05:17), Max: 37.3 (07-31-17 @ 19:03)  HR: 46 (08-01-17 @ 05:17) (40 - 52)  BP: 118/57 (08-01-17 @ 05:17) (118/57 - 144/82)  RR: 18 (08-01-17 @ 05:17) (18 - 18)  SpO2: 98% (08-01-17 @ 05:17) (96% - 99%)  Wt(kg): --Vital Signs Last 24 Hrs  T(C): 37 (01 Aug 2017 05:17), Max: 37.3 (31 Jul 2017 19:03)  T(F): 98.6 (01 Aug 2017 05:17), Max: 99.2 (31 Jul 2017 19:03)  HR: 46 (01 Aug 2017 05:17) (40 - 52)  BP: 118/57 (01 Aug 2017 05:17) (118/57 - 144/82)  BP(mean): --  RR: 18 (01 Aug 2017 05:17) (18 - 18)  SpO2: 98% (01 Aug 2017 05:17) (96% - 99%)  Wt(kg): --    PHYSICAL EXAM:  GENERAL: NAD, well-groomed, well-developed  HEAD:  Atraumatic, Normocephalic  EYES: EOMI, PERRLA, conjunctiva and sclera clear  ENMT: No tonsillar erythema, exudates,; Moist mucous membranes. No lesions  NECK: Supple, No JVD, Normal thyroid  CHEST/LUNG: Clear to percussion bilaterally; No rales, rhonchi, wheezing, or rubs  HEART: Regular rate and rhythm; No murmurs, rubs, or gallops  ABDOMEN: Soft, Nontender, Nondistended.  EXTREMITIES:  2+ Peripheral Pulses. No lower extremity edema noted today.  LYMPH: No lymphadenopathy noted  SKIN: No rashes or lesions  PSYCH: Alert & Oriented x3  NERVOUS SYSTEM:  ; Motor Strength 5/5 B/L upper and lower extremities.  Sensory intact    Consultant(s) Notes Reviewed:  [] YES  [ ] NO  Care Discussed with Consultants/Other Providers [ ] YES  [ ] NO    LABS:                        10.2   4.31  )-----------( 258      ( 01 Aug 2017 08:48 )             31.3     08-01    138  |  104  |  30<H>  ----------------------------<  75  5.0   |  23  |  1.82<H>    Ca    8.0<L>      01 Aug 2017 08:42  Phos  4.1     08-01  Mg     2.2     08-01    TPro  5.9<L>  /  Alb  2.0<L>  /  TBili  0.2  /  DBili  x   /  AST  32  /  ALT  37  /  AlkPhos  61  08-01    PT/INR - ( 30 Jul 2017 14:16 )   PT: 10.5 sec;   INR: 0.96 ratio         PTT - ( 30 Jul 2017 14:16 )  PTT:37.2 sec    CAPILLARY BLOOD GLUCOSE                RADIOLOGY & ADDITIONAL TESTS:    Imaging Personally Reviewed:  [ ] YES  [ ] NO STACIA HAYES  37y  Male      Patient is a 37y old  Male who presents with a chief complaint of Admission for IVIG infusion for FSGS (29 Jul 2017 17:39)      INTERVAL HPI/OVERNIGHT EVENTS: No acute events overnight. Patient reports feeling well, continues to have frothy urine. He reports improvement in his LE edema, though thinks he may have some slight abdominal swelling still.    T(C): 37 (08-01-17 @ 05:17), Max: 37.3 (07-31-17 @ 19:03)  HR: 46 (08-01-17 @ 05:17) (40 - 52)  BP: 118/57 (08-01-17 @ 05:17) (118/57 - 144/82)  RR: 18 (08-01-17 @ 05:17) (18 - 18)  SpO2: 98% (08-01-17 @ 05:17) (96% - 99%)  Wt(kg): --Vital Signs Last 24 Hrs  T(C): 37 (01 Aug 2017 05:17), Max: 37.3 (31 Jul 2017 19:03)  T(F): 98.6 (01 Aug 2017 05:17), Max: 99.2 (31 Jul 2017 19:03)  HR: 46 (01 Aug 2017 05:17) (40 - 52)  BP: 118/57 (01 Aug 2017 05:17) (118/57 - 144/82)  BP(mean): --  RR: 18 (01 Aug 2017 05:17) (18 - 18)  SpO2: 98% (01 Aug 2017 05:17) (96% - 99%)  Wt(kg): --    PHYSICAL EXAM:  GENERAL: NAD, well-groomed, well-developed  HEAD:  Atraumatic, Normocephalic  EYES: EOMI, PERRLA, conjunctiva and sclera clear  ENMT: No tonsillar erythema, exudates,; Moist mucous membranes. No lesions  NECK: Supple, No JVD, Normal thyroid  CHEST/LUNG: Clear to percussion bilaterally; No rales, rhonchi, wheezing, or rubs  HEART: Regular rate and rhythm; No murmurs, rubs, or gallops  ABDOMEN: Soft, Nontender, Nondistended.  EXTREMITIES:  2+ Peripheral Pulses. No lower extremity edema noted today.  LYMPH: No lymphadenopathy noted  SKIN: No rashes or lesions  PSYCH: Alert & Oriented x3  NERVOUS SYSTEM:  ; Motor Strength 5/5 B/L upper and lower extremities.  Sensory intact    Consultant(s) Notes Reviewed:  [] YES  [ ] NO  Care Discussed with Consultants/Other Providers [ ] YES  [ ] NO    LABS:                        10.2   4.31  )-----------( 258      ( 01 Aug 2017 08:48 )             31.3     08-01    138  |  104  |  30<H>  ----------------------------<  75  5.0   |  23  |  1.82<H>    Ca    8.0<L>      01 Aug 2017 08:42  Phos  4.1     08-01  Mg     2.2     08-01    TPro  5.9<L>  /  Alb  2.0<L>  /  TBili  0.2  /  DBili  x   /  AST  32  /  ALT  37  /  AlkPhos  61  08-01    PT/INR - ( 30 Jul 2017 14:16 )   PT: 10.5 sec;   INR: 0.96 ratio         PTT - ( 30 Jul 2017 14:16 )  PTT:37.2 sec        RADIOLOGY & ADDITIONAL TESTS:    Imaging Personally Reviewed:  [ ] YES  [ ] NO

## 2017-08-01 NOTE — PROGRESS NOTE ADULT - ATTENDING COMMENTS
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 This is likely associated in his case with an acute recent paro B19 infection ( igM positive in [facility] admission split record) and awaiting PCR.  Collapsing FSGs has been associated with many viruses classically being HIV, HTLV , CMV and parvo. Now given + CMV IgM and EBV IgM( can be seen with acute parvo)  and his confession re cheating on his wife, there is a need to rule out HIV PCR and cd4 count as HIV ab was negative and could this be an acute HIV infection leading to these tests?    Plan:  IVIG 400mg/kg daily ( 4/5 today)  No acute HD indications at this time.  Cont lasix and feels better with it  Appreciate ID eval  Proteinuria and renal function improving on IVIG  Check HIV pcr, repeat parvo PCR and add CMV and EBV PCR  Check t cell subset as well  Appt ID f/u        Deric Villatoro MD  Cell   Pager   Office

## 2017-08-01 NOTE — PROGRESS NOTE ADULT - ASSESSMENT
Mr. 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

## 2017-08-01 NOTE — PROGRESS NOTE ADULT - PROBLEM SELECTOR PLAN 1
Renal biopsy on  w/ evidence of FSGS  - IVIG infusions day 4/  - avoid nephrotoxins; renally dose meds  -Creatinine improvin.29 on admission, 1.93 (17), 1.82 (17).  -Continue to trend CMP tomorrow AM

## 2017-08-01 NOTE — PROGRESS NOTE ADULT - PROBLEM SELECTOR PLAN 4
Mildly elevated K due to hemolysis 7/29/17, improved since admission  -K this am 5.0  - bradycardia to high 40s  - EKG wnl  - will follow closely with continued AM CMPs

## 2017-08-01 NOTE — PROGRESS NOTE ADULT - SUBJECTIVE AND OBJECTIVE BOX
[facility] DIVISION OF KIDNEY DISEASES AND HYPERTENSION -- FOLLOW UP NOTE  --------------------------------------------------------------------------------  Chief Complaint:  in distress today as he confessed " i cheated on my wife few weeks ago" and now is in distress. Crying at bedside. He has not confronted his wife yet,    24 hour events/subjective:  crt downtrending      PAST HISTORY  --------------------------------------------------------------------------------  No significant changes to PMH, PSH, FHx, SHx, unless otherwise noted    ALLERGIES & MEDICATIONS  --------------------------------------------------------------------------------  Allergies    apple (Urticaria)  Bananas (Urticaria)  Grapes (Urticaria)  No Known Drug Allergies  Orange (Urticaria)    Intolerances      Standing Inpatient Medications  heparin  Injectable 5000 Unit(s) SubCutaneous every 8 hours  furosemide   Injectable 40 milliGRAM(s) IV Push daily    PRN Inpatient Medications  acetaminophen   Tablet. 650 milliGRAM(s) Oral every 6 hours PRN      REVIEW OF SYSTEMS  --------------------------------------------------------------------------------  Gen: No weight changes, fatigue, fevers/chills, weakness  Skin: No rashes  Head/Eyes/Ears/Mouth: No headache; Normal hearing; Normal vision w/o blurriness; No sinus pain/discomfort, sore throat  Respiratory: No dyspnea, cough, wheezing, hemoptysis  CV: No chest pain, PND, orthopnea  GI: No abdominal pain, diarrhea, constipation, nausea, vomiting, melena, hematochezia  : No increased frequency, dysuria, hematuria, nocturia  MSK: No joint pain/swelling; no back pain; no edema  Neuro: No dizziness/lightheadedness, weakness, seizures, numbness, tingling  Heme: No easy bruising or bleeding  Endo: No heat/cold intolerance  Psych: No significant nervousness, anxiety, stress, depression    All other systems were reviewed and are negative, except as noted.    VITALS/PHYSICAL EXAM  --------------------------------------------------------------------------------  T(C): 36.7 (08-01-17 @ 13:34), Max: 37.3 (07-31-17 @ 19:03)  HR: 41 (08-01-17 @ 13:34) (40 - 52)  BP: 116/67 (08-01-17 @ 13:34) (116/67 - 136/71)  RR: 18 (08-01-17 @ 13:34) (18 - 18)  SpO2: 96% (08-01-17 @ 13:34) (96% - 99%)  Wt(kg): --        07-31-17 @ 07:01  -  08-01-17 @ 07:00  --------------------------------------------------------  IN: 1260 mL / OUT: 2600 mL / NET: -1340 mL      Physical Exam:  	Gen: NAD, well-appearing  	HEENT: PERRL, supple neck, clear oropharynx  	Pulm: CTA B/L  	CV: RRR, S1S2; no rub  	Back: No spinal or CVA tenderness; no sacral edema  	Abd: +BS, soft, nontender/nondistended  	: No suprapubic tenderness  	UE: Warm, FROM, no clubbing, intact strength; no edema; no asterixis  	LE: Warm, FROM, no clubbing, intact strength; no edema  	Neuro: No focal deficits, intact gait  	Psych: Normal affect and mood  	Skin: Warm, without rashes  	Vascular access:    LABS/STUDIES  --------------------------------------------------------------------------------              10.2   4.31  >-----------<  258      [08-01-17 @ 08:48]              31.3     138  |  104  |  30  ----------------------------<  75      [08-01-17 @ 08:42]  5.0   |  23  |  1.82        Ca     8.0     [08-01-17 @ 08:42]      Mg     2.2     [08-01-17 @ 08:42]      Phos  4.1     [08-01-17 @ 08:42]    TPro  5.9  /  Alb  2.0  /  TBili  0.2  /  DBili  x   /  AST  32  /  ALT  37  /  AlkPhos  61  [08-01-17 @ 08:42]          Creatinine Trend:  SCr 1.82 [08-01 @ 08:42]  SCr 1.93 [07-31 @ 07:22]  SCr 2.28 [07-30 @ 08:47]  SCr 2.20 [07-29 @ 21:34]  SCr 2.29 [07-29 @ 13:40]

## 2017-08-01 NOTE — PROGRESS NOTE ADULT - PROBLEM SELECTOR PLAN 3
Pt with improvement in LE edema but some continued abdominal edema per patient  - lasix 40mg iv daily  - serum albumin trending down. serum protein low, continue with renal diet  - may need to put patient on full anticoagulation therapy; will discuss with renal attending. Pt with improvement in LE edema but some continued abdominal edema per patient  - lasix 40mg iv daily  - serum albumin trending down. serum protein low, continue with renal diet  - may need to put patient on full anticoagulation therapy; will discuss with renal attending. PT/PTT, INR were within normal limits which is reassuring

## 2017-08-01 NOTE — PROGRESS NOTE ADULT - PROBLEM SELECTOR PLAN 4
Mildly elevated K due to hemolysis 7/29/17, improved since admission  -K this am 5.0  - bradycardia to high 40s  - EKG wnl  - will follow closely with continued AM CMPs Mildly elevated K due to hemolysis 7/29/17, improved since admission  -K this am 5.0  - Resolved since admission   -Continued bradycardia to high 40s  - EKG wnl  - will follow closely with continued AM CMPs

## 2017-08-01 NOTE — PROGRESS NOTE ADULT - SUBJECTIVE AND OBJECTIVE BOX
Patient is a 37y old  Male who presents with a chief complaint of Admission for IVIG infusion for FSGS (29 Jul 2017 17:39)        SUBJECTIVE / OVERNIGHT EVENTS: to be completed      MEDICATIONS  (STANDING):  heparin  Injectable 5000 Unit(s) SubCutaneous every 8 hours  furosemide   Injectable 40 milliGRAM(s) IV Push daily    MEDICATIONS  (PRN):  acetaminophen   Tablet. 650 milliGRAM(s) Oral every 6 hours PRN Moderate Pain (4 - 6)        CAPILLARY BLOOD GLUCOSE        I&O's Summary    31 Jul 2017 07:01  -  01 Aug 2017 07:00  --------------------------------------------------------  IN: 1260 mL / OUT: 2600 mL / NET: -1340 mL        PHYSICAL EXAM  GENERAL: NAD, well-developed  HEAD:  Atraumatic, Normocephalic  EYES: EOMI, PERRLA, conjunctiva and sclera clear  NECK: Supple, No JVD  CHEST/LUNG: Clear to auscultation bilaterally; No wheeze  HEART: Regular rate and rhythm; No murmurs, rubs, or gallops  ABDOMEN: Soft, Nontender, Nondistended; Bowel sounds present  EXTREMITIES:  2+ Peripheral Pulses, No clubbing, cyanosis, or edema  PSYCH: AAOx3  SKIN: No rashes or lesions    LABS:                        10.2   4.31  )-----------( 258      ( 01 Aug 2017 08:48 )             31.3     08-01    138  |  104  |  30<H>  ----------------------------<  75  5.0   |  23  |  1.82<H>    Ca    8.0<L>      01 Aug 2017 08:42  Phos  4.1     08-01  Mg     2.2     08-01    TPro  5.9<L>  /  Alb  2.0<L>  /  TBili  0.2  /  DBili  x   /  AST  32  /  ALT  37  /  AlkPhos  61  08-01    PT/INR - ( 30 Jul 2017 14:16 )   PT: 10.5 sec;   INR: 0.96 ratio         PTT - ( 30 Jul 2017 14:16 )  PTT:37.2 sec          RADIOLOGY & ADDITIONAL TESTS:    Imaging Personally Reviewed:  Consultant(s) Notes Reviewed:    Care Discussed with Consultants/Other Providers: Patient is a 37y old  Male who presents with a chief complaint of Admission for IVIG infusion for FSGS (29 Jul 2017 17:39)        SUBJECTIVE / OVERNIGHT EVENTS: to be completed      MEDICATIONS  (STANDING):  heparin  Injectable 5000 Unit(s) SubCutaneous every 8 hours  furosemide   Injectable 40 milliGRAM(s) IV Push daily    MEDICATIONS  (PRN):  acetaminophen   Tablet. 650 milliGRAM(s) Oral every 6 hours PRN Moderate Pain (4 - 6)        CAPILLARY BLOOD GLUCOSE        I&O's Summary    31 Jul 2017 07:01  -  01 Aug 2017 07:00  --------------------------------------------------------  IN: 1260 mL / OUT: 2600 mL / NET: -1340 mL        PHYSICAL EXAM  GENERAL: NAD, well-developed  HEAD:  Atraumatic, Normocephalic  EYES: EOMI, PERRLA, conjunctiva and sclera clear  NECK: Supple, No JVD  CHEST/LUNG: Clear to auscultation bilaterally; No wheeze  HEART: Regular rate and rhythm; No murmurs, rubs, or gallops  ABDOMEN: Soft, Nontender, mildly distended per patient.; Bowel sounds present  EXTREMITIES:  2+ Peripheral Pulses, No clubbing, cyanosis; Trace LE edema b/l  PSYCH: AAOx3, appropriate mood and affect  SKIN: No rashes or lesions    LABS:                        10.2   4.31  )-----------( 258      ( 01 Aug 2017 08:48 )             31.3     08-01    138  |  104  |  30<H>  ----------------------------<  75  5.0   |  23  |  1.82<H>    Ca    8.0<L>      01 Aug 2017 08:42  Phos  4.1     08-01  Mg     2.2     08-01    TPro  5.9<L>  /  Alb  2.0<L>  /  TBili  0.2  /  DBili  x   /  AST  32  /  ALT  37  /  AlkPhos  61  08-01    PT/INR - ( 30 Jul 2017 14:16 )   PT: 10.5 sec;   INR: 0.96 ratio         PTT - ( 30 Jul 2017 14:16 )  PTT:37.2 sec          RADIOLOGY & ADDITIONAL TESTS:    Imaging Personally Reviewed:  Consultant(s) Notes Reviewed:    Care Discussed with Consultants/Other Providers:

## 2017-08-01 NOTE — PROGRESS NOTE ADULT - ASSESSMENT
Declining Creatinine gratifying.  Serology suggests acute EBV and possibly acute CMV exposure.    Discussed with Nephrology --- EBV, CMV PCR as well as HIV tests are being ordered.    I will be out 8/2 - ID service available if needed.

## 2017-08-01 NOTE — PROGRESS NOTE ADULT - PROBLEM SELECTOR PLAN 3
Pt with improvement in LE edema but some continued abdominal edema per patient  - lasix 40mg iv daily  - serum albumin trending down. serum protein low, continue with renal diet  - may need to put patient on full anticoagulation therapy; will discuss with renal attending. Pt with improvement in LE edema but some continued abdominal edema per patient  - lasix 40mg iv daily  - serum albumin trending down. serum protein low, continue with renal diet  - may need to put patient on full anticoagulation therapy; will discuss with renal attending.  - Urine Prot/Cr ratio 2.5

## 2017-08-02 LAB
4/8 RATIO: 2.62 RATIO — SIGNIFICANT CHANGE UP (ref 0.9–3.6)
ABS CD8: 242 /UL — SIGNIFICANT CHANGE UP (ref 136–757)
ALBUMIN SERPL ELPH-MCNC: 2 G/DL — LOW (ref 3.3–5)
ALP SERPL-CCNC: 60 U/L — SIGNIFICANT CHANGE UP (ref 40–120)
ALT FLD-CCNC: 40 U/L — SIGNIFICANT CHANGE UP (ref 10–45)
ANION GAP SERPL CALC-SCNC: 10 MMOL/L — SIGNIFICANT CHANGE UP (ref 5–17)
AST SERPL-CCNC: 42 U/L — HIGH (ref 10–40)
B19V DNA FLD QL NAA+PROBE: SIGNIFICANT CHANGE UP
B19V IGG SER QL IA: 5.4 INDEX
B19V IGG+IGM SER-IMP: NORMAL
B19V IGG+IGM SER-IMP: POSITIVE
B19V IGM FLD-ACNC: >12 INDEX
B19V IGM SER-ACNC: POSITIVE
BILIRUB SERPL-MCNC: 0.2 MG/DL — SIGNIFICANT CHANGE UP (ref 0.2–1.2)
BUN SERPL-MCNC: 25 MG/DL — HIGH (ref 7–23)
CALCIUM SERPL-MCNC: 7.8 MG/DL — LOW (ref 8.4–10.5)
CD3 BLASTS SPEC-ACNC: 65 % — SIGNIFICANT CHANGE UP (ref 59–85)
CD3 BLASTS SPEC-ACNC: 876 /UL — SIGNIFICANT CHANGE UP (ref 799–2171)
CD4 %: 47 % — SIGNIFICANT CHANGE UP (ref 36–65)
CD8 %: 18 % — SIGNIFICANT CHANGE UP (ref 11–36)
CHLORIDE SERPL-SCNC: 102 MMOL/L — SIGNIFICANT CHANGE UP (ref 96–108)
CMV DNA CSF QL NAA+PROBE: SIGNIFICANT CHANGE UP
CMV DNA SPEC NAA+PROBE-LOG#: SIGNIFICANT CHANGE UP LOGIU/ML
CO2 SERPL-SCNC: 24 MMOL/L — SIGNIFICANT CHANGE UP (ref 22–31)
CREAT SERPL-MCNC: 1.78 MG/DL — HIGH (ref 0.5–1.3)
EBV DNA SERPL NAA+PROBE-ACNC: SIGNIFICANT CHANGE UP IU/ML
EBVPCR LOG: SIGNIFICANT CHANGE UP LOGIU/ML
GLUCOSE SERPL-MCNC: 118 MG/DL — HIGH (ref 70–99)
MAGNESIUM SERPL-MCNC: 2.2 MG/DL — SIGNIFICANT CHANGE UP (ref 1.6–2.6)
PHOSPHATE SERPL-MCNC: 3.3 MG/DL — SIGNIFICANT CHANGE UP (ref 2.5–4.5)
POTASSIUM SERPL-MCNC: 5 MMOL/L — SIGNIFICANT CHANGE UP (ref 3.5–5.3)
POTASSIUM SERPL-SCNC: 5 MMOL/L — SIGNIFICANT CHANGE UP (ref 3.5–5.3)
PROT SERPL-MCNC: 6.2 G/DL — SIGNIFICANT CHANGE UP (ref 6–8.3)
SODIUM SERPL-SCNC: 136 MMOL/L — SIGNIFICANT CHANGE UP (ref 135–145)
T PALLIDUM AB TITR SER: NEGATIVE — SIGNIFICANT CHANGE UP
T-CELL CD4 SUBSET PNL BLD: 632 /UL — SIGNIFICANT CHANGE UP (ref 489–1457)

## 2017-08-02 PROCEDURE — 99232 SBSQ HOSP IP/OBS MODERATE 35: CPT | Mod: GC

## 2017-08-02 PROCEDURE — 99233 SBSQ HOSP IP/OBS HIGH 50: CPT | Mod: GC

## 2017-08-02 RX ORDER — IMMUNE GLOBULIN,GAMMA(IGG) 5 %
30 VIAL (ML) INTRAVENOUS ONCE
Qty: 0 | Refills: 0 | Status: COMPLETED | OUTPATIENT
Start: 2017-08-02 | End: 2017-08-02

## 2017-08-02 RX ORDER — DIPHENHYDRAMINE HCL 50 MG
50 CAPSULE ORAL ONCE
Qty: 0 | Refills: 0 | Status: COMPLETED | OUTPATIENT
Start: 2017-08-02 | End: 2017-08-02

## 2017-08-02 RX ORDER — ACETAMINOPHEN 500 MG
650 TABLET ORAL ONCE
Qty: 0 | Refills: 0 | Status: COMPLETED | OUTPATIENT
Start: 2017-08-02 | End: 2017-08-02

## 2017-08-02 RX ADMIN — Medication 50 MILLIGRAM(S): at 17:36

## 2017-08-02 RX ADMIN — Medication 50 GRAM(S): at 18:00

## 2017-08-02 RX ADMIN — Medication 650 MILLIGRAM(S): at 17:35

## 2017-08-02 RX ADMIN — Medication 650 MILLIGRAM(S): at 10:37

## 2017-08-02 RX ADMIN — HEPARIN SODIUM 5000 UNIT(S): 5000 INJECTION INTRAVENOUS; SUBCUTANEOUS at 06:09

## 2017-08-02 RX ADMIN — Medication 650 MILLIGRAM(S): at 11:37

## 2017-08-02 RX ADMIN — HEPARIN SODIUM 5000 UNIT(S): 5000 INJECTION INTRAVENOUS; SUBCUTANEOUS at 21:14

## 2017-08-02 RX ADMIN — Medication 40 MILLIGRAM(S): at 06:09

## 2017-08-02 RX ADMIN — HEPARIN SODIUM 5000 UNIT(S): 5000 INJECTION INTRAVENOUS; SUBCUTANEOUS at 12:23

## 2017-08-02 NOTE — PROGRESS NOTE ADULT - ASSESSMENT
Mr. 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     Adalberto Sun MD; PGY1  Medicine Team 1  Pager: [phone]  After 7 PM on weekdays and 12 PM on weekends page [phone]

## 2017-08-02 NOTE — PROGRESS NOTE ADULT - PROBLEM SELECTOR PLAN 1
IVIG 400mg/kg daily ( 5/5 today)  No acute HD indications at this time.  Continue lasix   Proteinuria and renal function improving on IVIG  HIV pcr, repeat parvo PCR and CMV and EBV PCR in progress  follow RPR

## 2017-08-02 NOTE — PROGRESS NOTE ADULT - SUBJECTIVE AND OBJECTIVE BOX
Patient is a 37y old  Male who presents with a chief complaint of Admission for IVIG infusion for FSGS (29 Jul 2017 17:39)        SUBJECTIVE / OVERNIGHT EVENTS: Tele: sinus danny 40s-50s. Otherwise no acute events overnight.      MEDICATIONS  (STANDING):  heparin  Injectable 5000 Unit(s) SubCutaneous every 8 hours  furosemide   Injectable 40 milliGRAM(s) IV Push daily    MEDICATIONS  (PRN):  acetaminophen   Tablet. 650 milliGRAM(s) Oral every 6 hours PRN Moderate Pain (4 - 6)        CAPILLARY BLOOD GLUCOSE        I&O's Summary    01 Aug 2017 07:01  -  02 Aug 2017 07:00  --------------------------------------------------------  IN: 780 mL / OUT: 0 mL / NET: 780 mL        PHYSICAL EXAM  GENERAL: NAD, well-developed  HEAD:  Atraumatic, Normocephalic  EYES: EOMI, PERRLA, conjunctiva and sclera clear  NECK: Supple, No JVD  CHEST/LUNG: Clear to auscultation bilaterally; No wheeze  HEART: Regular rate and rhythm; No murmurs, rubs, or gallops  ABDOMEN: Soft, Nontender, mildly distended per patient.; Bowel sounds present  EXTREMITIES:  2+ Peripheral Pulses, No clubbing, cyanosis; Trace LE edema b/l  PSYCH: AAOx3, appropriate mood and affect  SKIN: No rashes or lesions    LABS:                        10.2   4.31  )-----------( 258      ( 01 Aug 2017 08:48 )             31.3     08-01    138  |  104  |  30<H>  ----------------------------<  75  5.0   |  23  |  1.82<H>    Ca    8.0<L>      01 Aug 2017 08:42  Phos  4.1     08-01  Mg     2.2     08-01    TPro  5.9<L>  /  Alb  2.0<L>  /  TBili  0.2  /  DBili  x   /  AST  32  /  ALT  37  /  AlkPhos  61  08-01              RADIOLOGY & ADDITIONAL TESTS:    Imaging Personally Reviewed:  Consultant(s) Notes Reviewed:    Care Discussed with Consultants/Other Providers:

## 2017-08-02 NOTE — PROGRESS NOTE ADULT - PROBLEM SELECTOR PLAN 1
Renal biopsy on  w/ evidence of FSGS  - IVIG infusions day 4/5  - avoid nephrotoxins; renally dose meds  -Creatinine improvin.29 on admission, 1.93 (17), 1.82 (17).  -Continue to trend CMP tomorrow AM  - EBV and CMV serologies positive for acute infections.  - Added PCR for EBV, CMV, HIV.   - Added T cell subset, Quantiferon Gold TB, Renal biopsy on  w/ evidence of FSGS  - IVIG infusions day 5 of 5  - avoid nephrotoxins; renally dose meds  -Creatinine improvin.29 on admission, 1.93 (17), 1.82 (17). 1.78 (17)  -Continue to trend CMP tomorrow AM  - EBV and CMV serologies positive for acute infections.  - Added PCR for EBV, CMV, HIV.   - Added T cell subset, Quantiferon Gold TB,

## 2017-08-02 NOTE — PROGRESS NOTE ADULT - PROBLEM SELECTOR PLAN 3
Pt with improvement in LE edema but some continued abdominal edema per patient  - lasix 40mg iv daily  - serum albumin trending down. serum protein low, continue with renal diet  - may need to put patient on full anticoagulation therapy; will discuss with renal attending.  - Urine Prot/Cr ratio 2.5

## 2017-08-02 NOTE — PROGRESS NOTE ADULT - SUBJECTIVE AND OBJECTIVE BOX
[facility] DIVISION OF KIDNEY DISEASES AND HYPERTENSION -- FOLLOW UP NOTE  --------------------------------------------------------------------------------  Chief Complaint: none    24 hour events/subjective:  no acute complaint  Cr is downtrending.        PAST HISTORY  --------------------------------------------------------------------------------  No significant changes to PMH, PSH, FHx, SHx, unless otherwise noted    ALLERGIES & MEDICATIONS  --------------------------------------------------------------------------------  Allergies    apple (Urticaria)  Bananas (Urticaria)  Grapes (Urticaria)  No Known Drug Allergies  Orange (Urticaria)    Intolerances      Standing Inpatient Medications  heparin  Injectable 5000 Unit(s) SubCutaneous every 8 hours  furosemide   Injectable 40 milliGRAM(s) IV Push daily    PRN Inpatient Medications  acetaminophen   Tablet. 650 milliGRAM(s) Oral every 6 hours PRN      REVIEW OF SYSTEMS  --------------------------------------------------------------------------------  Gen: No weight changes, fatigue, fevers/chills, weakness  Skin: No rashes  Head/Eyes/Ears/Mouth: No headache; Normal hearing; Normal vision w/o blurriness; No sinus pain/discomfort, sore throat  Respiratory: No dyspnea, cough, wheezing, hemoptysis  CV: No chest pain, PND, orthopnea  GI: No abdominal pain, diarrhea, constipation, nausea, vomiting, melena, hematochezia  : No increased frequency, dysuria, hematuria, nocturia  MSK: No joint pain/swelling; no back pain; no edema  Neuro: No dizziness/lightheadedness, weakness, seizures, numbness, tingling  Heme: No easy bruising or bleeding  Endo: No heat/cold intolerance  Psych: No significant nervousness, anxiety, stress, depression    All other systems were reviewed and are negative, except as noted.    VITALS/PHYSICAL EXAM  --------------------------------------------------------------------------------  T(C): 36.7 (08-02-17 @ 05:51), Max: 37.3 (08-01-17 @ 20:13)  HR: 49 (08-02-17 @ 05:51) (41 - 54)  BP: 96/55 (08-02-17 @ 05:51) (96/55 - 126/69)  RR: 18 (08-02-17 @ 05:51) (18 - 18)  SpO2: 97% (08-02-17 @ 05:51) (96% - 98%)  Wt(kg): --        08-01-17 @ 07:01  -  08-02-17 @ 07:00  --------------------------------------------------------  IN: 780 mL / OUT: 0 mL / NET: 780 mL    08-02-17 @ 07:01  -  08-02-17 @ 11:39  --------------------------------------------------------  IN: 720 mL / OUT: 750 mL / NET: -30 mL      Physical Exam:  	Gen: NAD, well-appearing  	HEENT: PERRL, supple neck, clear oropharynx  	Pulm: CTA B/L  	CV: RRR, S1S2; no rub  	Back: No spinal or CVA tenderness; no sacral edema  	Abd: +BS, soft, nontender/nondistended  	: No suprapubic tenderness  	UE: Warm, FROM, no clubbing, intact strength; no edema; no asterixis  	LE: Warm, FROM, no clubbing, intact strength; no edema  	Neuro: No focal deficits, intact gait  	Psych: Normal affect and mood  	Skin: Warm, without rashes  	Vascular access:    LABS/STUDIES  --------------------------------------------------------------------------------              10.2   4.31  >-----------<  258      [08-01-17 @ 08:48]              31.3     136  |  102  |  25  ----------------------------<  118      [08-02-17 @ 07:32]  5.0   |  24  |  1.78        Ca     7.8     [08-02-17 @ 07:32]      Mg     2.2     [08-02-17 @ 07:32]      Phos  3.3     [08-02-17 @ 07:32]    TPro  6.2  /  Alb  2.0  /  TBili  0.2  /  DBili  x   /  AST  42  /  ALT  40  /  AlkPhos  60  [08-02-17 @ 07:32]          Creatinine Trend:  SCr 1.78 [08-02 @ 07:32]  SCr 1.82 [08-01 @ 08:42]  SCr 1.93 [07-31 @ 07:22]  SCr 2.28 [07-30 @ 08:47]  SCr 2.20 [07-29 @ 21:34]      Urine Creatinine 73      [08-01-17 @ 13:28]  Urine Protein 184      [08-01-17 @ 13:28]

## 2017-08-02 NOTE — PROGRESS NOTE ADULT - ASSESSMENT
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 This is likely associated in his case with an acute recent parvo B19 infection ( igM positive in [facility] admission split record) and awaiting PCR.  Collapsing FSGs has been associated with many viruses classically being HIV, HTLV , CMV and parvo. Now given + CMV IgM and EBV IgM( can be seen with acute parvo)  and his confession re cheating on his wife, there is a need to rule out HIV PCR and cd4 count as HIV ab was negative and could this be an acute HIV infection leading to these tests.

## 2017-08-02 NOTE — PROGRESS NOTE ADULT - ATTENDING COMMENTS
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 This is likely associated in his case with an acute recent paro B19 infection ( igM positive in [facility] admission split record) and +PCR of 70,000 +  Collapsing FSGS has been associated with many viruses classically being HIV, HTLV , CMV and parvo. Now given + CMV IgM and EBV IgM( can be seen with acute parvo as PCR is negative)  and his confession re cheating on his wife, there is a need to rule out HIV PCR and cd4 count as HIV ab was negative and could this be an acute HIV infection leading to these tests?    Plan:  IVIG 400mg/kg daily ( 5/5 today)  No acute HD indications at this time.  Cont lasix and feels better with it  Appreciate ID eval  Proteinuria and renal function improving on IVIG  HIV pcr pending  APOl1 gene testing not allowed in [facility], we looked into this  Likely DC tomorrow once HIV results back  Outpatient ACEI/ARB and f.u with Dr Elmo Collazo in 1-2 weeks       Deric Villatoro MD  Cell   Pager   Office

## 2017-08-03 ENCOUNTER — TRANSCRIPTION ENCOUNTER (OUTPATIENT)
Age: 37
End: 2017-08-03

## 2017-08-03 VITALS — WEIGHT: 204.81 LBS

## 2017-08-03 DIAGNOSIS — B99.9 UNSPECIFIED INFECTIOUS DISEASE: ICD-10-CM

## 2017-08-03 LAB
ALBUMIN SERPL ELPH-MCNC: 1.9 G/DL — LOW (ref 3.3–5)
ALP SERPL-CCNC: 64 U/L — SIGNIFICANT CHANGE UP (ref 40–120)
ALT FLD-CCNC: 45 U/L — SIGNIFICANT CHANGE UP (ref 10–45)
ANION GAP SERPL CALC-SCNC: 12 MMOL/L — SIGNIFICANT CHANGE UP (ref 5–17)
AST SERPL-CCNC: 41 U/L — HIGH (ref 10–40)
B19V DNA FLD QL NAA+PROBE: NORMAL
BASOPHILS # BLD AUTO: 0.06 K/UL — SIGNIFICANT CHANGE UP (ref 0–0.2)
BASOPHILS # BLD AUTO: 0.06 K/UL — SIGNIFICANT CHANGE UP (ref 0–0.2)
BASOPHILS NFR BLD AUTO: 1.6 % — SIGNIFICANT CHANGE UP (ref 0–2)
BASOPHILS NFR BLD AUTO: 1.7 % — SIGNIFICANT CHANGE UP (ref 0–2)
BILIRUB SERPL-MCNC: 0.2 MG/DL — SIGNIFICANT CHANGE UP (ref 0.2–1.2)
BUN SERPL-MCNC: 24 MG/DL — HIGH (ref 7–23)
CALCIUM SERPL-MCNC: 7.7 MG/DL — LOW (ref 8.4–10.5)
CHLORIDE SERPL-SCNC: 101 MMOL/L — SIGNIFICANT CHANGE UP (ref 96–108)
CO2 SERPL-SCNC: 25 MMOL/L — SIGNIFICANT CHANGE UP (ref 22–31)
CREAT SERPL-MCNC: 1.71 MG/DL — HIGH (ref 0.5–1.3)
CULTURE RESULTS: SIGNIFICANT CHANGE UP
EOSINOPHIL # BLD AUTO: 0.03 K/UL — SIGNIFICANT CHANGE UP (ref 0–0.5)
EOSINOPHIL # BLD AUTO: 0.04 K/UL — SIGNIFICANT CHANGE UP (ref 0–0.5)
EOSINOPHIL NFR BLD AUTO: 0.8 % — SIGNIFICANT CHANGE UP (ref 0–6)
EOSINOPHIL NFR BLD AUTO: 1.1 % — SIGNIFICANT CHANGE UP (ref 0–6)
GLUCOSE SERPL-MCNC: 83 MG/DL — SIGNIFICANT CHANGE UP (ref 70–99)
HCT VFR BLD CALC: 32.3 % — LOW (ref 39–50)
HCT VFR BLD CALC: 34.7 % — LOW (ref 39–50)
HGB BLD-MCNC: 10.5 G/DL — LOW (ref 13–17)
HGB BLD-MCNC: 11.1 G/DL — LOW (ref 13–17)
HIV-1 VIRAL LOAD RESULT: SIGNIFICANT CHANGE UP
HIV1 RNA # SERPL NAA+PROBE: SIGNIFICANT CHANGE UP
HIV1 RNA SER-IMP: SIGNIFICANT CHANGE UP
HIV1 RNA SERPL NAA+PROBE-ACNC: SIGNIFICANT CHANGE UP
HIV1 RNA SERPL NAA+PROBE-LOG#: SIGNIFICANT CHANGE UP LG COP/ML
IMM GRANULOCYTES NFR BLD AUTO: 0.3 % — SIGNIFICANT CHANGE UP (ref 0–1.5)
IMM GRANULOCYTES NFR BLD AUTO: 0.3 % — SIGNIFICANT CHANGE UP (ref 0–1.5)
LYMPHOCYTES # BLD AUTO: 0.89 K/UL — LOW (ref 1–3.3)
LYMPHOCYTES # BLD AUTO: 1.23 K/UL — SIGNIFICANT CHANGE UP (ref 1–3.3)
LYMPHOCYTES # BLD AUTO: 24.7 % — SIGNIFICANT CHANGE UP (ref 13–44)
LYMPHOCYTES # BLD AUTO: 32.6 % — SIGNIFICANT CHANGE UP (ref 13–44)
MAGNESIUM SERPL-MCNC: 2 MG/DL — SIGNIFICANT CHANGE UP (ref 1.6–2.6)
MCHC RBC-ENTMCNC: 28.2 PG — SIGNIFICANT CHANGE UP (ref 27–34)
MCHC RBC-ENTMCNC: 29 PG — SIGNIFICANT CHANGE UP (ref 27–34)
MCHC RBC-ENTMCNC: 32 GM/DL — SIGNIFICANT CHANGE UP (ref 32–36)
MCHC RBC-ENTMCNC: 32.5 GM/DL — SIGNIFICANT CHANGE UP (ref 32–36)
MCV RBC AUTO: 88.3 FL — SIGNIFICANT CHANGE UP (ref 80–100)
MCV RBC AUTO: 89.2 FL — SIGNIFICANT CHANGE UP (ref 80–100)
MONOCYTES # BLD AUTO: 0.41 K/UL — SIGNIFICANT CHANGE UP (ref 0–0.9)
MONOCYTES # BLD AUTO: 0.49 K/UL — SIGNIFICANT CHANGE UP (ref 0–0.9)
MONOCYTES NFR BLD AUTO: 11.4 % — SIGNIFICANT CHANGE UP (ref 2–14)
MONOCYTES NFR BLD AUTO: 13 % — SIGNIFICANT CHANGE UP (ref 2–14)
NEUTROPHILS # BLD AUTO: 1.94 K/UL — SIGNIFICANT CHANGE UP (ref 1.8–7.4)
NEUTROPHILS # BLD AUTO: 2.21 K/UL — SIGNIFICANT CHANGE UP (ref 1.8–7.4)
NEUTROPHILS NFR BLD AUTO: 51.4 % — SIGNIFICANT CHANGE UP (ref 43–77)
NEUTROPHILS NFR BLD AUTO: 61.1 % — SIGNIFICANT CHANGE UP (ref 43–77)
PHOSPHATE SERPL-MCNC: 3.2 MG/DL — SIGNIFICANT CHANGE UP (ref 2.5–4.5)
PLATELET # BLD AUTO: 270 K/UL — SIGNIFICANT CHANGE UP (ref 150–400)
PLATELET # BLD AUTO: 294 K/UL — SIGNIFICANT CHANGE UP (ref 150–400)
POTASSIUM SERPL-MCNC: 4.6 MMOL/L — SIGNIFICANT CHANGE UP (ref 3.5–5.3)
POTASSIUM SERPL-SCNC: 4.6 MMOL/L — SIGNIFICANT CHANGE UP (ref 3.5–5.3)
PROT SERPL-MCNC: 7 G/DL — SIGNIFICANT CHANGE UP (ref 6–8.3)
RBC # BLD: 3.62 M/UL — LOW (ref 4.2–5.8)
RBC # BLD: 3.93 M/UL — LOW (ref 4.2–5.8)
RBC # FLD: 13.3 % — SIGNIFICANT CHANGE UP (ref 10.3–14.5)
RBC # FLD: 13.9 % — SIGNIFICANT CHANGE UP (ref 10.3–14.5)
SODIUM SERPL-SCNC: 138 MMOL/L — SIGNIFICANT CHANGE UP (ref 135–145)
SPECIMEN SOURCE: SIGNIFICANT CHANGE UP
WBC # BLD: 3.61 K/UL — LOW (ref 3.8–10.5)
WBC # BLD: 3.77 K/UL — LOW (ref 3.8–10.5)
WBC # FLD AUTO: 3.61 K/UL — LOW (ref 3.8–10.5)
WBC # FLD AUTO: 3.77 K/UL — LOW (ref 3.8–10.5)

## 2017-08-03 PROCEDURE — 86360 T CELL ABSOLUTE COUNT/RATIO: CPT

## 2017-08-03 PROCEDURE — 82803 BLOOD GASES ANY COMBINATION: CPT

## 2017-08-03 PROCEDURE — 80053 COMPREHEN METABOLIC PANEL: CPT

## 2017-08-03 PROCEDURE — 87536 HIV-1 QUANT&REVRSE TRNSCRPJ: CPT

## 2017-08-03 PROCEDURE — 86665 EPSTEIN-BARR CAPSID VCA: CPT

## 2017-08-03 PROCEDURE — 99285 EMERGENCY DEPT VISIT HI MDM: CPT | Mod: 25

## 2017-08-03 PROCEDURE — 99239 HOSP IP/OBS DSCHRG MGMT >30: CPT

## 2017-08-03 PROCEDURE — 84132 ASSAY OF SERUM POTASSIUM: CPT

## 2017-08-03 PROCEDURE — 85014 HEMATOCRIT: CPT

## 2017-08-03 PROCEDURE — 84295 ASSAY OF SERUM SODIUM: CPT

## 2017-08-03 PROCEDURE — 83605 ASSAY OF LACTIC ACID: CPT

## 2017-08-03 PROCEDURE — 86644 CMV ANTIBODY: CPT

## 2017-08-03 PROCEDURE — 99232 SBSQ HOSP IP/OBS MODERATE 35: CPT

## 2017-08-03 PROCEDURE — 85610 PROTHROMBIN TIME: CPT

## 2017-08-03 PROCEDURE — 84156 ASSAY OF PROTEIN URINE: CPT

## 2017-08-03 PROCEDURE — 86780 TREPONEMA PALLIDUM: CPT

## 2017-08-03 PROCEDURE — 93005 ELECTROCARDIOGRAM TRACING: CPT

## 2017-08-03 PROCEDURE — 99233 SBSQ HOSP IP/OBS HIGH 50: CPT | Mod: GC

## 2017-08-03 PROCEDURE — 85730 THROMBOPLASTIN TIME PARTIAL: CPT

## 2017-08-03 PROCEDURE — 86664 EPSTEIN-BARR NUCLEAR ANTIGEN: CPT

## 2017-08-03 PROCEDURE — 82947 ASSAY GLUCOSE BLOOD QUANT: CPT

## 2017-08-03 PROCEDURE — 86480 TB TEST CELL IMMUN MEASURE: CPT

## 2017-08-03 PROCEDURE — 87799 DETECT AGENT NOS DNA QUANT: CPT

## 2017-08-03 PROCEDURE — 86663 EPSTEIN-BARR ANTIBODY: CPT

## 2017-08-03 PROCEDURE — 82435 ASSAY OF BLOOD CHLORIDE: CPT

## 2017-08-03 PROCEDURE — 86645 CMV ANTIBODY IGM: CPT

## 2017-08-03 PROCEDURE — 83735 ASSAY OF MAGNESIUM: CPT

## 2017-08-03 PROCEDURE — 80048 BASIC METABOLIC PNL TOTAL CA: CPT

## 2017-08-03 PROCEDURE — 82330 ASSAY OF CALCIUM: CPT

## 2017-08-03 PROCEDURE — 85027 COMPLETE CBC AUTOMATED: CPT

## 2017-08-03 PROCEDURE — 84100 ASSAY OF PHOSPHORUS: CPT

## 2017-08-03 PROCEDURE — 87040 BLOOD CULTURE FOR BACTERIA: CPT

## 2017-08-03 RX ORDER — FUROSEMIDE 40 MG
1 TABLET ORAL
Qty: 15 | Refills: 0 | OUTPATIENT
Start: 2017-08-03 | End: 2017-09-02

## 2017-08-03 RX ADMIN — HEPARIN SODIUM 5000 UNIT(S): 5000 INJECTION INTRAVENOUS; SUBCUTANEOUS at 13:54

## 2017-08-03 RX ADMIN — Medication 650 MILLIGRAM(S): at 13:54

## 2017-08-03 RX ADMIN — Medication 650 MILLIGRAM(S): at 13:09

## 2017-08-03 RX ADMIN — HEPARIN SODIUM 5000 UNIT(S): 5000 INJECTION INTRAVENOUS; SUBCUTANEOUS at 05:15

## 2017-08-03 RX ADMIN — Medication 40 MILLIGRAM(S): at 05:15

## 2017-08-03 NOTE — PROGRESS NOTE ADULT - SUBJECTIVE AND OBJECTIVE BOX
Patient is a 37y old  Male who presents with a chief complaint of Admission for IVIG infusion for FSGS (29 Jul 2017 17:39)        SUBJECTIVE / OVERNIGHT EVENTS: Pt has completed his IVIG infusions. He is feeling well. He has been cleared for discharge by ID.      MEDICATIONS  (STANDING):  heparin  Injectable 5000 Unit(s) SubCutaneous every 8 hours  furosemide   Injectable 40 milliGRAM(s) IV Push daily    MEDICATIONS  (PRN):  acetaminophen   Tablet. 650 milliGRAM(s) Oral every 6 hours PRN Moderate Pain (4 - 6)        CAPILLARY BLOOD GLUCOSE        I&O's Summary    02 Aug 2017 07:01  -  03 Aug 2017 07:00  --------------------------------------------------------  IN: 2220 mL / OUT: 1950 mL / NET: 270 mL        PHYSICAL EXAM  GENERAL: NAD, well-developed  HEAD:  Atraumatic, Normocephalic  EYES: EOMI, PERRLA, conjunctiva and sclera clear  NECK: Supple, No JVD  CHEST/LUNG: Clear to auscultation bilaterally; No wheeze  HEART: Regular rate and rhythm; No murmurs, rubs, or gallops  ABDOMEN: Soft, Nontender, mildly distended per patient.; Bowel sounds present  EXTREMITIES:  2+ Peripheral Pulses, No clubbing, cyanosis; Trace LE edema b/l  PSYCH: AAOx3, appropriate mood and affect  SKIN: No rashes or lesions    LABS:                        11.1   3.77  )-----------( 294      ( 03 Aug 2017 07:18 )             34.7     08-03    138  |  101  |  24<H>  ----------------------------<  83  4.6   |  25  |  1.71<H>    Ca    7.7<L>      03 Aug 2017 07:37  Phos  3.2     08-03  Mg     2.0     08-03    TPro  7.0  /  Alb  1.9<L>  /  TBili  0.2  /  DBili  x   /  AST  41<H>  /  ALT  45  /  AlkPhos  64  08-03      T Cell Subset (08.01.17 @ 10:10)    ABS CD8: 242 /uL    ABS CD3: 876 /uL    ABS CD4: 632 /uL    CD3 %: 65 %    CD8 %: 18 %    4/8 Ratio: 2.62 RATIO    CD4 %: 47 %    MICROBIOLOGY:  Rosio Barr Virus DNA by PCR - Blood: NotDetec IU/mL (08.01.17 @ 08:48)    Parvovirus B19 DNA by PCR (08.01.17 @ 08:48)  70,700 copies/mL  Specimen Source: .Blood Blood (07.29.17 @ 14:56)    Culture - Blood (07.29.17 @ 14:56)    Specimen Source: .Blood Blood    Culture Results:   No growth to date.    CMV IgG Antibody: 7.80 U/mL (07-31 @ 07:30)    CMVPCR Log: NotDetec LogIU/mL (08-01 @ 08:48)        RADIOLOGY & ADDITIONAL TESTS:    Imaging Personally Reviewed:  Consultant(s) Notes Reviewed:    Care Discussed with Consultants/Other Providers:

## 2017-08-03 NOTE — PROGRESS NOTE ADULT - PROBLEM SELECTOR PLAN 4
Mildly elevated K due to hemolysis 7/29/17, improved since admission  - K has been wnl w/o intervention.  - bradycardia to high 40s  - EKG wnl  - will follow closely with continued AM CMPs

## 2017-08-03 NOTE — PROGRESS NOTE ADULT - PROBLEM SELECTOR PLAN 5
Followed by ID and Renal  - HIV test was negative at [facility] last week. HIV Viral Load from 8/1/10 is pending.  - Parvovirus B19 + serologies and PCR.  - EBV and CMV - PCR negative  - HBV, HCV negative test at [facility]  - FTA-ABS negative.  - Quantiferon Gold - TB negative. Followed by ID and Renal  - HIV test was negative at [facility] last week. HIV Viral Load from 8/1/10 is pending.  - Parvovirus B19 + serologies and PCR.  - EBV and CMV - PCR negative  - HBV, HCV negative test at [facility]  - FTA-ABS negative.  - Quantiferon Gold - TB negative.  - Cleared for discharge by ID (Braulio Washington) and he will inform pt of HIV result.
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
## 2017-08-03 NOTE — DISCHARGE NOTE ADULT - MEDICATION SUMMARY - MEDICATIONS TO TAKE
I will START or STAY ON the medications listed below when I get home from the hospital:  None I will START or STAY ON the medications listed below when I get home from the hospital:    Lasix 40 mg oral tablet  -- 1 tab(s) by mouth 3 times a week Mondays, Wednesdays, Fridays  -- Avoid prolonged or excessive exposure to direct and/or artificial sunlight while taking this medication.  It is very important that you take or use this exactly as directed.  Do not skip doses or discontinue unless directed by your doctor.  It may be advisable to drink a full glass orange juice or eat a banana daily while taking this medication.    -- Indication: For Nephrotic syndrome

## 2017-08-03 NOTE — PROGRESS NOTE ADULT - PROBLEM SELECTOR PROBLEM 1
FSGS (focal segmental glomerulosclerosis)

## 2017-08-03 NOTE — PROGRESS NOTE ADULT - PROBLEM SELECTOR PLAN 1
Renal biopsy on  w/ evidence of FSGS  - IVIG infusions, 5 days complete  - avoid nephrotoxins; renally dose meds  -Creatinine improvin.29 on admission, 1.93 (17), 1.82 (17). 1.78 (17), 1.71 (17)  - EBV and CMV serologies positive for acute infections; PCR was negative.  - Awaiting HIV viral load.    - Added T cell subset, Quantiferon Gold TB, Renal biopsy on  w/ evidence of FSGS  - IVIG infusions, 5 days complete  - avoid nephrotoxins; renally dose meds  -Creatinine improvin.29 on admission, 1.93 (17), 1.82 (17). 1.78 (17), 1.71 (17)  - EBV and CMV serologies positive for acute infections; PCR was negative.  - Awaiting HIV viral load.

## 2017-08-03 NOTE — DIETITIAN INITIAL EVALUATION ADULT. - OTHER INFO
Patient seen by nutrition for length of stay.  He reports eating meals > 75% since his admission.  Denies any difficulty chewing or swallowing and has no nausea/emesis or GI discomfort at the present time.   Patient reports his usual body weight is 205 lbs and that he lost 20 lbs PTA.  Weight when he was admitted was ~ 205 lbs and patient reports gaining fluid weight.  Patient confirmed food allergies - apples, bananas, grapes, oranges and avocados.  When he has eaten these foods he has itchiness around his mouth and in his throat.

## 2017-08-03 NOTE — DISCHARGE NOTE ADULT - PROVIDER TOKENS
MARLINE:'[id number]' TOKEN:'[id number]',FREE:[LAST:[Lang],FIRST:[Lorenzo],PHONE:[phone]],FAX:[(   [phone]    -],ADDRESS:[[address] [place] [phone]]]

## 2017-08-03 NOTE — PROGRESS NOTE ADULT - ATTENDING COMMENTS
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 This is likely associated in his case with an acute recent paro B19 infection ( igM positive in [facility] admission split record) and +PCR of 70,000( down from 500,000 at [facility]) +  Collapsing FSGS has been associated with many viruses classically being HIV, HTLV , CMV and parvo. Now given + CMV IgM and EBV IgM( can be seen with acute parvo as PCR is negative)  and his confession re cheating on his wife, there is a need to rule out HIV PCR and cd4 count as HIV ab was negative and could this be an acute HIV infection leading to these tests?- HIV PCR pending.    Plan:  IVIG 400mg/kg daily ( 5/5 completed)  His viral load, proteinuria down from 7 gm to 2.5gm and crt down to 1.7mg/dl and stable  Kidney bx staining for parvo is negative but that's not unusual. 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  Cont lasix and feels better with it  Appreciate ID eval  and will need HIV test f/u as outpatient  APOl1 gene testing not allowed in [facility], we looked into this ( can be done as outpatient by Dr Elmo Collazo)  Outpatient ACEI/ARB and   F/u with Dr Collazo on August 24th 830AM at [address] [place] [address] ( [phone])      Deric Villatoro MD  Cell   Pager   Office

## 2017-08-03 NOTE — PROGRESS NOTE ADULT - PROBLEM SELECTOR PLAN 2
- Normocytic Anemia  - Parvo serologies and PCR positive. - Normocytic Anemia  - Parvo serologies and PCR positive.  - per ID, supportive care.

## 2017-08-03 NOTE — PROGRESS NOTE ADULT - PROBLEM SELECTOR PROBLEM 2
Nephrotic syndrome
Nephrotic syndrome
Parvovirus B19 infection

## 2017-08-03 NOTE — PROGRESS NOTE ADULT - SUBJECTIVE AND OBJECTIVE BOX
Post discharge addendum  HIV viral load test: NO VIRUS DETECTED  -patient's cell phone called and message left  BH

## 2017-08-03 NOTE — PROGRESS NOTE ADULT - SUBJECTIVE AND OBJECTIVE BOX
Follow Up:      Interval History/ROS: denies complaints    Allergies  apple (Urticaria)  Bananas (Urticaria)  Grapes (Urticaria)  No Known Drug Allergies  Orange (Urticaria)    ANTIMICROBIALS:      OTHER MEDS:  MEDICATIONS  (STANDING):  heparin  Injectable 5000 every 8 hours  furosemide   Injectable 40 daily  acetaminophen   Tablet. 650 every 6 hours PRN    Vital Signs Last 24 Hrs  T(C): 36.9 (03 Aug 2017 05:07), Max: 37.1 (02 Aug 2017 18:00)  T(F): 98.5 (03 Aug 2017 05:07), Max: 98.8 (02 Aug 2017 19:12)  HR: 45 (03 Aug 2017 05:07) (42 - 55)  BP: 141/73 (03 Aug 2017 05:07) (126/82 - 141/73)  BP(mean): --  RR: 18 (03 Aug 2017 05:07) (18 - 18)  SpO2: 99% (03 Aug 2017 05:07) (97% - 99%)    PHYSICAL EXAM:  General: WN/WD NAD, Non-toxic  Neurology: A&Ox3, nonfocal  Respiratory: Clear to auscultation bilaterally  CV: RRR, S1S2, no murmurs, rubs or gallops  Abdominal: Soft, Non-tender, non-distended, normal bowel sounds  Extremities: No edema, + peripheral pulses  Line Sites: Clear  Skin: No rash                          11.1   3.77  )-----------( 294      ( 03 Aug 2017 07:18 )             34.7       08-03    138  |  101  |  24<H>  ----------------------------<  83  4.6   |  25  |  1.71<H>    Ca    7.7<L>      03 Aug 2017 07:37  Phos  3.2     08-03  Mg     2.0     08-03    TPro  7.0  /  Alb  1.9<L>  /  TBili  0.2  /  DBili  x   /  AST  41<H>  /  ALT  45  /  AlkPhos  64  08-03          MICROBIOLOGY:  Rosio Barr Virus DNA by PCR - Blood: NotDetec IU/mL (08.01.17 @ 08:48)    Parvovirus B19 DNA by PCR (08.01.17 @ 08:48)  70,700 copies/mL  Specimen Source: .Blood Blood (07.29.17 @ 14:56)    Culture - Blood (07.29.17 @ 14:56)    Specimen Source: .Blood Blood    Culture Results:   No growth to date.    CMV IgG Antibody: 7.80 U/mL (07-31 @ 07:30)    CMVPCR Log: NotDetec LogIU/mL (08-01 @ 08:48)    Braulio Chung MD; Division of Infectious Disease; Pager: [phone]; nights and weekends: [phone]

## 2017-08-03 NOTE — DISCHARGE NOTE ADULT - ADDITIONAL INSTRUCTIONS
You are set up for an appointment with your Nephrologist, Dr. Elmo Collazo on August 24th 830AM at [address] [place] [address] ([phone]). He will evaluate you then and possibly start you on a medication called ACE inhibitor or an ARB. Please call the [facility] at [phone] to follow up within. You are set up for an appointment with your Nephrologist, Dr. Elmo Collazo on August 24th 830AM at [address] [place] [address] ([phone]). He will evaluate you then and possibly start you on a medication called ACE inhibitor or an ARB. Please call the [facility] at [phone] to follow up within 1 week of discharge. They will evaluate you and possibly start you on a medication called ACE inhibitor or an ARB. You are also set up for an appointment with your Nephrologist, Dr. Elmo Collazo on August 24th 830AM at [address] [place] [address] ([phone]).

## 2017-08-03 NOTE — DISCHARGE NOTE ADULT - CARE PLAN
Principal Discharge DX:	FSGS (focal segmental glomerulosclerosis)  Goal:	You came in to the hospital due to biopsy proven FSGS possibly caused by Parvovirus B19 infection, for which you received 5 days of IVIG infusions. You were stable throughout your hospital stay.  Instructions for follow-up, activity and diet:	Please follow up with Nephrologist, Dr. Elmo Collazo on August 24th 830AM at [address] [place] [address] ([phone]).  Secondary Diagnosis:	Nephrotic syndrome  Secondary Diagnosis:	Infectious disease  Secondary Diagnosis:	Hyperkalemia Principal Discharge DX:	FSGS (focal segmental glomerulosclerosis)  Goal:	You came in to the hospital due to biopsy proven FSGS possibly caused by Parvovirus B19 infection, for which you received 5 days of IVIG infusions. You were stable throughout your hospital stay.  Instructions for follow-up, activity and diet:	Please follow up with Nephrologist, Dr. Elmo Collazo on August 24th 830AM at [address] [place] [address] ([phone]).  Secondary Diagnosis:	Nephrotic syndrome  Goal:	One of the secondary effects of FSGS is nephrotic syndrome where a lot of protein that normally remains in your blood goes into your urine. This is what is causing you to be swollen in your legs and stomach.  Instructions for follow-up, activity and diet:	Follow up with Dr. Elmo Collazo. Measure your weight daily in the morning. You will retain water because of the nature of the disease. If you start to have chest pain, shortness of breath, decreased exercise tolerance, signs of a stroke (focal areas of muscle weakness/paralysis) please utilize the emergency services by dialing 911.  Secondary Diagnosis:	Infectious disease  Goal:	We have done extensive testing to understand what may have caused your FSGS. Dr. Chung will call you within a few days with any outstanding results  Secondary Diagnosis:	Hyperkalemia  Goal:	You had a mild elevation in potassium in your blood. No intervention was necessary as the level quickly returned to normal.  Instructions for follow-up, activity and diet:	If you begin to have chest pains, palpitations, shortness of breath, please utilize the emergency services immediately by calling 911. Principal Discharge DX:	FSGS (focal segmental glomerulosclerosis)  Goal:	You came in to the hospital due to biopsy proven FSGS possibly caused by Parvovirus B19 infection, for which you received 5 days of IVIG infusions. You were stable throughout your hospital stay.  Instructions for follow-up, activity and diet:	Please follow up with Nephrologist, Dr. Elmo Collazo on August 24th 830AM at [address] [place] [address] ([phone]).  Secondary Diagnosis:	Nephrotic syndrome  Goal:	One of the secondary effects of FSGS is nephrotic syndrome where a lot of protein that normally remains in your blood goes into your urine. This is what is causing you to be swollen in your legs and stomach.  Instructions for follow-up, activity and diet:	Follow up with Dr. Elmo Collazo. Measure your weight daily in the morning. You will retain water because of the nature of the disease. If you start to have chest pain, shortness of breath, decreased exercise tolerance, signs of a stroke (focal areas of muscle weakness/paralysis) please utilize the emergency services by dialing 911.  Secondary Diagnosis:	Infectious disease  Goal:	We have done extensive testing to understand what may have caused your FSGS. Dr. Chung will call you within a few days with any outstanding results  Secondary Diagnosis:	Hyperkalemia  Goal:	You had a mild elevation in potassium in your blood. No intervention was necessary as the level quickly returned to normal.  Instructions for follow-up, activity and diet:	If you begin to have chest pains, palpitations, shortness of breath, please utilize the emergency services immediately by calling 911. Principal Discharge DX:	FSGS (focal segmental glomerulosclerosis)  Goal:	You came in to the hospital due to biopsy proven FSGS possibly caused by Parvovirus B19 infection, for which you received 5 days of IVIG infusions. You were stable throughout your hospital stay.  Instructions for follow-up, activity and diet:	Please follow up with Nephrologist, Dr. Elmo Collazo on August 24th 830AM at [address] [place] [address] ([phone]).  Secondary Diagnosis:	Nephrotic syndrome  Goal:	One of the secondary effects of FSGS is nephrotic syndrome where a lot of protein that normally remains in your blood goes into your urine. This is what is causing you to be swollen in your legs and stomach.  Instructions for follow-up, activity and diet:	Follow up with Dr. Elmo Collazo. Measure your weight daily in the morning. You will retain water because of the nature of the disease. If you start to have chest pain, shortness of breath, decreased exercise tolerance, signs of a stroke (focal areas of muscle weakness/paralysis) please utilize the emergency services by dialing 911.  Secondary Diagnosis:	Infectious disease  Goal:	We have done extensive testing to understand what may have caused your FSGS. Dr. Chung will call you within a few days with any outstanding results  Secondary Diagnosis:	Hyperkalemia  Goal:	You had a mild elevation in potassium in your blood. No intervention was necessary as the level quickly returned to normal.  Instructions for follow-up, activity and diet:	If you begin to have chest pains, palpitations, shortness of breath, please utilize the emergency services immediately by calling 911. Principal Discharge DX:	FSGS (focal segmental glomerulosclerosis)  Goal:	You came in to the hospital due to biopsy proven FSGS possibly caused by Parvovirus B19 infection, for which you received 5 days of IVIG infusions. You were stable throughout your hospital stay.  Instructions for follow-up, activity and diet:	Please follow up with Nephrologist, Dr. Elmo Collazo on August 24th 830AM at [address] [place] [address] ([phone]).  Secondary Diagnosis:	Nephrotic syndrome  Goal:	One of the secondary effects of FSGS is nephrotic syndrome where a lot of protein that normally remains in your blood goes into your urine. This is what is causing you to be swollen in your legs and stomach.  Instructions for follow-up, activity and diet:	Follow up with Dr. Elmo Collazo. Measure your weight daily in the morning. You will retain water because of the nature of the disease. In the hospital, we gave you lasix to help urinate excess fluid. I have sent and electronic prescription to [facility] at [facility]. Please take 1 tab on Mondays, Wednesdays, Fridays. If you start to have chest pain, shortness of breath, decreased exercise tolerance, signs of a stroke (focal areas of muscle weakness/paralysis) please utilize the emergency services by dialing 911.  Secondary Diagnosis:	Infectious disease  Goal:	We have done extensive testing to understand what may have caused your FSGS. Dr. Chung will call you within a few days with any outstanding results  Secondary Diagnosis:	Hyperkalemia  Goal:	You had a mild elevation in potassium in your blood. No intervention was necessary as the level quickly returned to normal.  Instructions for follow-up, activity and diet:	If you begin to have chest pains, palpitations, shortness of breath, please utilize the emergency services immediately by calling 911.

## 2017-08-03 NOTE — DISCHARGE NOTE ADULT - PLAN OF CARE
You came in to the hospital due to biopsy proven FSGS possibly caused by Parvovirus B19 infection, for which you received 5 days of IVIG infusions. You were stable throughout your hospital stay. Please follow up with Nephrologist, Dr. Elmo Collazo on August 24th 830AM at [address] [place] [address] ([phone]). One of the secondary effects of FSGS is nephrotic syndrome where a lot of protein that normally remains in your blood goes into your urine. This is what is causing you to be swollen in your legs and stomach. Follow up with Dr. Elmo Collazo. Measure your weight daily in the morning. You will retain water because of the nature of the disease. If you start to have chest pain, shortness of breath, decreased exercise tolerance, signs of a stroke (focal areas of muscle weakness/paralysis) please utilize the emergency services by dialing 911. We have done extensive testing to understand what may have caused your FSGS. Dr. Chung will call you within a few days with any outstanding results You had a mild elevation in potassium in your blood. No intervention was necessary as the level quickly returned to normal. If you begin to have chest pains, palpitations, shortness of breath, please utilize the emergency services immediately by calling 911. Follow up with Dr. Elmo Collazo. Measure your weight daily in the morning. You will retain water because of the nature of the disease. In the hospital, we gave you lasix to help urinate excess fluid. I have sent and electronic prescription to [facility] at [facility]. Please take 1 tab on Mondays, Wednesdays, Fridays. If you start to have chest pain, shortness of breath, decreased exercise tolerance, signs of a stroke (focal areas of muscle weakness/paralysis) please utilize the emergency services by dialing 911.

## 2017-08-03 NOTE — DISCHARGE NOTE ADULT - HOSPITAL COURSE
Pt is a 36 yo M w/ no significant PMH admitted for FSGS 2/2 parvovirus infection. Initially presented to [facility] on 7/24 w/ frothy urine, fever, and joint pain. Extensive work up and kidney biopsy done at [facility] which showed FSGS. Admitted to [facility] for 5 days of IVIG infusion and further ID work up. Pt negative for HBV, HCV, Quant Gold for TB, HIV, FTA-ABS. Positive PCR for Parvo B19, CMV, and EBV. T cell subsets wnl. Dr. Chung of ID and Dr. Villatoro of Nephrology have been following the case. Labs also notable for decreasing Albumin (1.9 on 8/3/2017). Initially elevated Cr but has been trending down (1.71 on 8/3/2017). He is to have outpatient follow up with Dr. Elmo Collazo on August 24th 830AM at [address] [place] [address] ([phone]), where he will be evaluated, and will likely be requiring ACEI/ARB.

## 2017-08-03 NOTE — DISCHARGE NOTE ADULT - CARE PROVIDER_API CALL
Elmo Collazo), Internal Medicine  [address] [place] [address]  Phone: [phone]  Fax: [phone] Elmo Collazo), Internal Medicine  [address] [place] [address]  Phone: [phone]  Fax: [phone]    [facility]  [address] [place]. [address]  Phone: [phone]  Fax: (   )    -
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
## 2017-08-03 NOTE — DIETITIAN INITIAL EVALUATION ADULT. - ENERGY NEEDS
Height: 6' 4"     Weight: 183.8 lbs    BMI: 22.4 kg/m2    IBW: 202 lbs  (+/- 10%)    % IBW: 91 %          Edema: none noted       Skin:  no impairment       Other pertinent information: Patient presented with fever and joint pain.  Admitted with FSGS, 2/2 parvovirus B19 infection, nephrotic syndrome, hyperkalemia.  Renal biopsy done on 7/27/17.

## 2017-08-03 NOTE — DIETITIAN INITIAL EVALUATION ADULT. - ORAL INTAKE PTA
Patient reports having a good appetite PTA and that he was eating meals well.   Reports that he did have some decrease in he meal consumption 1 - 2 weeks PTA.  Reported that he has some weight loss during that time.

## 2017-08-03 NOTE — DIETITIAN INITIAL EVALUATION ADULT. - NS AS NUTRI INTERV ED CONTENT
Purpose of the nutrition education/RD provided printed materials on high potassium and low potassium foods.  Advised patient to limit high potassium foods should potassium level be elevated on lab data.

## 2017-08-03 NOTE — DISCHARGE NOTE ADULT - PATIENT PORTAL LINK FT
“You can access the Follow[employer] Patient Portal, offered by [facility], by registering with the following website: [email or web address]”

## 2017-08-03 NOTE — PROGRESS NOTE ADULT - ASSESSMENT
Mr. 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     Adalberto Sun MD; PGY1  Medicine Team 1  Pager: [phone]  After 7 PM on weekdays and 12 PM on weekends page [phone]

## 2017-08-04 LAB
C TRACH RRNA SPEC QL NAA+PROBE: SIGNIFICANT CHANGE UP
N GONORRHOEA RRNA SPEC QL NAA+PROBE: SIGNIFICANT CHANGE UP
SPECIMEN SOURCE: SIGNIFICANT CHANGE UP

## 2017-08-08 LAB
ALBUMIN SERPL ELPH-MCNC: 3.2 G/DL
ANION GAP SERPL CALC-SCNC: 14 MMOL/L
BUN SERPL-MCNC: 21 MG/DL
CALCIUM SERPL-MCNC: 8.8 MG/DL
CHLORIDE SERPL-SCNC: 102 MMOL/L
CO2 SERPL-SCNC: 23 MMOL/L
CREAT SERPL-MCNC: 1.79 MG/DL
CREAT SPEC-SCNC: 84 MG/DL
CREAT/PROT UR: 1.4 RATIO
GLUCOSE SERPL-MCNC: 95 MG/DL
PHOSPHATE SERPL-MCNC: 4.9 MG/DL
POTASSIUM SERPL-SCNC: 4.7 MMOL/L
PROT UR-MCNC: 119 MG/DL
SODIUM SERPL-SCNC: 139 MMOL/L

## 2017-08-09 LAB — B19V DNA FLD QL NAA+PROBE: NORMAL

## 2017-08-10 ENCOUNTER — APPOINTMENT (OUTPATIENT)
Dept: NEPHROLOGY | Facility: CLINIC | Age: 37
End: 2017-08-10
Payer: COMMERCIAL

## 2017-08-10 VITALS
DIASTOLIC BLOOD PRESSURE: 70 MMHG | BODY MASS INDEX: 22.01 KG/M2 | SYSTOLIC BLOOD PRESSURE: 104 MMHG | HEART RATE: 81 BPM | HEIGHT: 76 IN | WEIGHT: 180.78 LBS | OXYGEN SATURATION: 97 %

## 2017-08-10 DIAGNOSIS — Z87.898 PERSONAL HISTORY OF OTHER SPECIFIED CONDITIONS: ICD-10-CM

## 2017-08-10 PROCEDURE — 99215 OFFICE O/P EST HI 40 MIN: CPT

## 2017-08-11 LAB
ALBUMIN SERPL ELPH-MCNC: 3 G/DL
ANION GAP SERPL CALC-SCNC: 16 MMOL/L
BASOPHILS # BLD AUTO: 0.06 K/UL
BASOPHILS NFR BLD AUTO: 2 %
BUN SERPL-MCNC: 15 MG/DL
CALCIUM SERPL-MCNC: 8.9 MG/DL
CHLORIDE SERPL-SCNC: 104 MMOL/L
CHOLEST SERPL-MCNC: 193 MG/DL
CHOLEST/HDLC SERPL: 4 RATIO
CO2 SERPL-SCNC: 21 MMOL/L
CREAT SERPL-MCNC: 1.54 MG/DL
CREAT SPEC-SCNC: 95 MG/DL
CREAT/PROT UR: 1.4 RATIO
EOSINOPHIL # BLD AUTO: 0.05 K/UL
EOSINOPHIL NFR BLD AUTO: 1.6 %
GLUCOSE SERPL-MCNC: 87 MG/DL
HCT VFR BLD CALC: 35.5 %
HDLC SERPL-MCNC: 48 MG/DL
HGB BLD-MCNC: 11.5 G/DL
IMM GRANULOCYTES NFR BLD AUTO: 0.3 %
LDLC SERPL CALC-MCNC: 114 MG/DL
LYMPHOCYTES # BLD AUTO: 1.27 K/UL
LYMPHOCYTES NFR BLD AUTO: 41.5 %
MAN DIFF?: NORMAL
MCHC RBC-ENTMCNC: 29 PG
MCHC RBC-ENTMCNC: 32.4 GM/DL
MCV RBC AUTO: 89.4 FL
MONOCYTES # BLD AUTO: 0.31 K/UL
MONOCYTES NFR BLD AUTO: 10.1 %
NEUTROPHILS # BLD AUTO: 1.36 K/UL
NEUTROPHILS NFR BLD AUTO: 44.5 %
PHOSPHATE SERPL-MCNC: 3.4 MG/DL
PLATELET # BLD AUTO: 278 K/UL
POTASSIUM SERPL-SCNC: 5.3 MMOL/L
PROT UR-MCNC: 135 MG/DL
RBC # BLD: 3.97 M/UL
RBC # FLD: 13.3 %
SODIUM SERPL-SCNC: 141 MMOL/L
TRIGL SERPL-MCNC: 154 MG/DL
WBC # FLD AUTO: 3.06 K/UL

## 2017-08-21 ENCOUNTER — APPOINTMENT (OUTPATIENT)
Dept: INTERNAL MEDICINE | Facility: CLINIC | Age: 37
End: 2017-08-21
Payer: COMMERCIAL

## 2017-08-21 VITALS
SYSTOLIC BLOOD PRESSURE: 120 MMHG | HEIGHT: 76 IN | WEIGHT: 177 LBS | BODY MASS INDEX: 21.55 KG/M2 | DIASTOLIC BLOOD PRESSURE: 70 MMHG

## 2017-08-21 DIAGNOSIS — Z87.828 PERSONAL HISTORY OF OTHER (HEALED) PHYSICAL INJURY AND TRAUMA: ICD-10-CM

## 2017-08-21 DIAGNOSIS — Z87.891 PERSONAL HISTORY OF NICOTINE DEPENDENCE: ICD-10-CM

## 2017-08-21 DIAGNOSIS — Z83.3 FAMILY HISTORY OF DIABETES MELLITUS: ICD-10-CM

## 2017-08-21 PROCEDURE — 99203 OFFICE O/P NEW LOW 30 MIN: CPT

## 2017-08-24 ENCOUNTER — APPOINTMENT (OUTPATIENT)
Dept: NEPHROLOGY | Facility: CLINIC | Age: 37
End: 2017-08-24
Payer: COMMERCIAL

## 2017-08-24 VITALS
OXYGEN SATURATION: 98 % | BODY MASS INDEX: 21.48 KG/M2 | WEIGHT: 176.37 LBS | HEIGHT: 76 IN | SYSTOLIC BLOOD PRESSURE: 95 MMHG | HEART RATE: 89 BPM | DIASTOLIC BLOOD PRESSURE: 66 MMHG

## 2017-08-24 PROBLEM — Z00.00 ENCOUNTER FOR PREVENTIVE HEALTH EXAMINATION: Noted: 2017-08-24

## 2017-08-24 LAB
ALBUMIN SERPL ELPH-MCNC: 3.7 G/DL
ANION GAP SERPL CALC-SCNC: 14 MMOL/L
BASOPHILS # BLD AUTO: 0.04 K/UL
BASOPHILS NFR BLD AUTO: 1 %
BUN SERPL-MCNC: 24 MG/DL
CALCIUM SERPL-MCNC: 9.3 MG/DL
CHLORIDE SERPL-SCNC: 105 MMOL/L
CO2 SERPL-SCNC: 20 MMOL/L
CREAT SERPL-MCNC: 1.4 MG/DL
EOSINOPHIL # BLD AUTO: 0.07 K/UL
EOSINOPHIL NFR BLD AUTO: 1.7 %
FERRITIN SERPL-MCNC: 471 NG/ML
GLUCOSE SERPL-MCNC: 94 MG/DL
HCT VFR BLD CALC: 36.4 %
HGB BLD-MCNC: 12.1 G/DL
IMM GRANULOCYTES NFR BLD AUTO: 0 %
IRON SATN MFR SERPL: 55 %
IRON SERPL-MCNC: 163 UG/DL
LYMPHOCYTES # BLD AUTO: 1.89 K/UL
LYMPHOCYTES NFR BLD AUTO: 45.1 %
MAN DIFF?: NORMAL
MCHC RBC-ENTMCNC: 29.5 PG
MCHC RBC-ENTMCNC: 33.2 GM/DL
MCV RBC AUTO: 88.8 FL
MONOCYTES # BLD AUTO: 0.36 K/UL
MONOCYTES NFR BLD AUTO: 8.6 %
NEUTROPHILS # BLD AUTO: 1.83 K/UL
NEUTROPHILS NFR BLD AUTO: 43.6 %
PHOSPHATE SERPL-MCNC: 3.5 MG/DL
PLATELET # BLD AUTO: 289 K/UL
POTASSIUM SERPL-SCNC: 4.8 MMOL/L
RBC # BLD: 4.1 M/UL
RBC # FLD: 13.6 %
SODIUM SERPL-SCNC: 139 MMOL/L
TIBC SERPL-MCNC: 295 UG/DL
TSH SERPL-ACNC: 0.32 UIU/ML
UIBC SERPL-MCNC: 132 UG/DL
WBC # FLD AUTO: 4.19 K/UL

## 2017-08-24 PROCEDURE — 99214 OFFICE O/P EST MOD 30 MIN: CPT

## 2017-08-25 LAB
CREAT SPEC-SCNC: 143 MG/DL
CREAT/PROT UR: 1.3 RATIO
PROT UR-MCNC: 184 MG/DL

## 2017-08-28 LAB — B19V DNA FLD QL NAA+PROBE: NORMAL

## 2017-09-01 ENCOUNTER — APPOINTMENT (OUTPATIENT)
Dept: NEPHROLOGY | Facility: CLINIC | Age: 37
End: 2017-09-01
Payer: COMMERCIAL

## 2017-09-01 VITALS — DIASTOLIC BLOOD PRESSURE: 60 MMHG | HEART RATE: 84 BPM | SYSTOLIC BLOOD PRESSURE: 114 MMHG

## 2017-09-01 PROCEDURE — 99214 OFFICE O/P EST MOD 30 MIN: CPT

## 2017-09-13 ENCOUNTER — APPOINTMENT (OUTPATIENT)
Dept: INTERNAL MEDICINE | Facility: CLINIC | Age: 37
End: 2017-09-13
Payer: COMMERCIAL

## 2017-09-13 VITALS
BODY MASS INDEX: 22.52 KG/M2 | HEART RATE: 76 BPM | WEIGHT: 185 LBS | DIASTOLIC BLOOD PRESSURE: 70 MMHG | SYSTOLIC BLOOD PRESSURE: 110 MMHG | OXYGEN SATURATION: 97 %

## 2017-09-13 DIAGNOSIS — E01.0 IODINE-DEFICIENCY RELATED DIFFUSE (ENDEMIC) GOITER: ICD-10-CM

## 2017-09-13 DIAGNOSIS — M67.912 UNSPECIFIED DISORDER OF SYNOVIUM AND TENDON, LEFT SHOULDER: ICD-10-CM

## 2017-09-13 DIAGNOSIS — R73.03 PREDIABETES.: ICD-10-CM

## 2017-09-13 DIAGNOSIS — Z80.0 FAMILY HISTORY OF MALIGNANT NEOPLASM OF DIGESTIVE ORGANS: ICD-10-CM

## 2017-09-13 PROCEDURE — 99395 PREV VISIT EST AGE 18-39: CPT

## 2017-10-04 ENCOUNTER — APPOINTMENT (OUTPATIENT)
Dept: PEDIATRIC ALLERGY IMMUNOLOGY | Facility: CLINIC | Age: 37
End: 2017-10-04

## 2017-12-03 ENCOUNTER — EMERGENCY (EMERGENCY)
Facility: HOSPITAL | Age: 37
LOS: 1 days | Discharge: HOME | End: 2017-12-03
Attending: EMERGENCY MEDICINE | Admitting: EMERGENCY MEDICINE
Payer: COMMERCIAL

## 2017-12-03 VITALS
SYSTOLIC BLOOD PRESSURE: 111 MMHG | OXYGEN SATURATION: 97 % | RESPIRATION RATE: 18 BRPM | HEART RATE: 64 BPM | DIASTOLIC BLOOD PRESSURE: 72 MMHG

## 2017-12-03 VITALS
RESPIRATION RATE: 20 BRPM | OXYGEN SATURATION: 96 % | DIASTOLIC BLOOD PRESSURE: 88 MMHG | TEMPERATURE: 98 F | HEART RATE: 68 BPM | SYSTOLIC BLOOD PRESSURE: 137 MMHG

## 2017-12-03 DIAGNOSIS — Z98.890 OTHER SPECIFIED POSTPROCEDURAL STATES: Chronic | ICD-10-CM

## 2017-12-03 DIAGNOSIS — S21.302D: Chronic | ICD-10-CM

## 2017-12-03 LAB
ALBUMIN SERPL ELPH-MCNC: 3.7 G/DL — SIGNIFICANT CHANGE UP (ref 3.3–5)
ALP SERPL-CCNC: 50 U/L — SIGNIFICANT CHANGE UP (ref 40–120)
ALT FLD-CCNC: 25 U/L RC — SIGNIFICANT CHANGE UP (ref 10–45)
ANION GAP SERPL CALC-SCNC: 12 MMOL/L — SIGNIFICANT CHANGE UP (ref 5–17)
AST SERPL-CCNC: 23 U/L — SIGNIFICANT CHANGE UP (ref 10–40)
BASOPHILS # BLD AUTO: 0.1 K/UL — SIGNIFICANT CHANGE UP (ref 0–0.2)
BASOPHILS NFR BLD AUTO: 1.9 % — SIGNIFICANT CHANGE UP (ref 0–2)
BILIRUB SERPL-MCNC: 0.4 MG/DL — SIGNIFICANT CHANGE UP (ref 0.2–1.2)
BUN SERPL-MCNC: 18 MG/DL — SIGNIFICANT CHANGE UP (ref 7–23)
CALCIUM SERPL-MCNC: 8.7 MG/DL — SIGNIFICANT CHANGE UP (ref 8.4–10.5)
CHLORIDE SERPL-SCNC: 105 MMOL/L — SIGNIFICANT CHANGE UP (ref 96–108)
CO2 SERPL-SCNC: 23 MMOL/L — SIGNIFICANT CHANGE UP (ref 22–31)
CREAT SERPL-MCNC: 1.26 MG/DL — SIGNIFICANT CHANGE UP (ref 0.5–1.3)
EOSINOPHIL # BLD AUTO: 0.4 K/UL — SIGNIFICANT CHANGE UP (ref 0–0.5)
EOSINOPHIL NFR BLD AUTO: 9.6 % — HIGH (ref 0–6)
GLUCOSE SERPL-MCNC: 110 MG/DL — HIGH (ref 70–99)
HCT VFR BLD CALC: 38.4 % — LOW (ref 39–50)
HGB BLD-MCNC: 13 G/DL — SIGNIFICANT CHANGE UP (ref 13–17)
LYMPHOCYTES # BLD AUTO: 2.1 K/UL — SIGNIFICANT CHANGE UP (ref 1–3.3)
LYMPHOCYTES # BLD AUTO: 49.5 % — HIGH (ref 13–44)
MCHC RBC-ENTMCNC: 32 PG — SIGNIFICANT CHANGE UP (ref 27–34)
MCHC RBC-ENTMCNC: 34 GM/DL — SIGNIFICANT CHANGE UP (ref 32–36)
MCV RBC AUTO: 94.1 FL — SIGNIFICANT CHANGE UP (ref 80–100)
MONOCYTES # BLD AUTO: 0.4 K/UL — SIGNIFICANT CHANGE UP (ref 0–0.9)
MONOCYTES NFR BLD AUTO: 10 % — SIGNIFICANT CHANGE UP (ref 2–14)
NEUTROPHILS # BLD AUTO: 1.2 K/UL — LOW (ref 1.8–7.4)
NEUTROPHILS NFR BLD AUTO: 28.9 % — LOW (ref 43–77)
PLATELET # BLD AUTO: 193 K/UL — SIGNIFICANT CHANGE UP (ref 150–400)
POTASSIUM SERPL-MCNC: 4.6 MMOL/L — SIGNIFICANT CHANGE UP (ref 3.5–5.3)
POTASSIUM SERPL-SCNC: 4.6 MMOL/L — SIGNIFICANT CHANGE UP (ref 3.5–5.3)
PROT SERPL-MCNC: 6.4 G/DL — SIGNIFICANT CHANGE UP (ref 6–8.3)
RBC # BLD: 4.08 M/UL — LOW (ref 4.2–5.8)
RBC # FLD: 11.1 % — SIGNIFICANT CHANGE UP (ref 10.3–14.5)
SODIUM SERPL-SCNC: 140 MMOL/L — SIGNIFICANT CHANGE UP (ref 135–145)
WBC # BLD: 4.3 K/UL — SIGNIFICANT CHANGE UP (ref 3.8–10.5)
WBC # FLD AUTO: 4.3 K/UL — SIGNIFICANT CHANGE UP (ref 3.8–10.5)

## 2017-12-03 PROCEDURE — 85027 COMPLETE CBC AUTOMATED: CPT

## 2017-12-03 PROCEDURE — 80053 COMPREHEN METABOLIC PANEL: CPT

## 2017-12-03 PROCEDURE — 99285 EMERGENCY DEPT VISIT HI MDM: CPT | Mod: 25

## 2017-12-03 PROCEDURE — 71046 X-RAY EXAM CHEST 2 VIEWS: CPT

## 2017-12-03 PROCEDURE — 71020: CPT | Mod: 26

## 2017-12-03 PROCEDURE — 94640 AIRWAY INHALATION TREATMENT: CPT

## 2017-12-03 RX ORDER — AZITHROMYCIN 500 MG/1
1 TABLET, FILM COATED ORAL
Qty: 4 | Refills: 0 | OUTPATIENT
Start: 2017-12-03 | End: 2017-12-07

## 2017-12-03 RX ORDER — AZITHROMYCIN 500 MG/1
500 TABLET, FILM COATED ORAL ONCE
Qty: 0 | Refills: 0 | Status: COMPLETED | OUTPATIENT
Start: 2017-12-03 | End: 2017-12-03

## 2017-12-03 RX ORDER — ALBUTEROL 90 UG/1
2.5 AEROSOL, METERED ORAL ONCE
Qty: 0 | Refills: 0 | Status: COMPLETED | OUTPATIENT
Start: 2017-12-03 | End: 2017-12-03

## 2017-12-03 RX ORDER — ALBUTEROL 90 UG/1
2 AEROSOL, METERED ORAL EVERY 6 HOURS
Qty: 0 | Refills: 0 | Status: DISCONTINUED | OUTPATIENT
Start: 2017-12-03 | End: 2017-12-07

## 2017-12-03 RX ADMIN — AZITHROMYCIN 500 MILLIGRAM(S): 500 TABLET, FILM COATED ORAL at 05:52

## 2017-12-03 RX ADMIN — ALBUTEROL 2 PUFF(S): 90 AEROSOL, METERED ORAL at 06:26

## 2017-12-03 RX ADMIN — Medication 50 MILLIGRAM(S): at 05:52

## 2017-12-03 RX ADMIN — ALBUTEROL 2.5 MILLIGRAM(S): 90 AEROSOL, METERED ORAL at 04:03

## 2017-12-03 RX ADMIN — ALBUTEROL 2.5 MILLIGRAM(S): 90 AEROSOL, METERED ORAL at 05:52

## 2017-12-03 NOTE — ED ADULT NURSE NOTE - OBJECTIVE STATEMENT
Pt is a 37YOM hx of FSRD received ambulatory A&Ox4 complaining of intermittent productive wet cough x3 weeks. Pt is a 37YOM hx of FSGS received ambulatory A&Ox4 complaining of intermittent productive wet cough x3 weeks. Pt states that he began coughing 3 weeks ago, denies any fever/chills sick contacts, but the cough has been persistent and tonight had some SOb which promted him to come to the ED. Pts wife at bedside notes that pt "cant sleep at night, so I made him come in" Pt states that his sputum is intermittently thin and yellow tinged, no blood present. Pt denies any MAO dizziness chest pain SOB nausea vomiting fever or chills.

## 2017-12-03 NOTE — ED PROVIDER NOTE - MEDICAL DECISION MAKING DETAILS
38 yo M w/ PMH of FSGS presenting with wheezing and dry cough x 3 weeks, diffuse wheezes on exam. Will obtain CXR, CBC, CMP. Will give albuterol nebulizer treatment. Reassess.

## 2017-12-03 NOTE — ED PROVIDER NOTE - PLAN OF CARE
Please take your Albuterol Inhaler as needed for wheezing. Please complete your prednisone (one pill per day x 4 days) and azithromycin (one pill per day x 4 days) courses. Please follow all instructions on the medication packaging. Please follow up with your primary care provider as soon as possible. Please return to the ED if you experience worsening wheezing, cough, shortness of breath, fever, chest pain, or for any other concerns.

## 2017-12-03 NOTE — ED ADULT TRIAGE NOTE - CHIEF COMPLAINT QUOTE
productive cough x 3 weeks, worsening into sob  pt talking in complete sentences no resp distress noted

## 2017-12-03 NOTE — ED PROVIDER NOTE - CARE PLAN
Principal Discharge DX:	Reactive airway disease  Instructions for follow-up, activity and diet:	Please take your Albuterol Inhaler as needed for wheezing. Please complete your prednisone (one pill per day x 4 days) and azithromycin (one pill per day x 4 days) courses. Please follow all instructions on the medication packaging. Please follow up with your primary care provider as soon as possible. Please return to the ED if you experience worsening wheezing, cough, shortness of breath, fever, chest pain, or for any other concerns.

## 2017-12-03 NOTE — ED PROVIDER NOTE - OBJECTIVE STATEMENT
36 yo M w/ PMH of FSGS presenting with wheezing x 3 weeks and dry cough x 1 week. Patient's wife noticed that patient was wheezing during sleep 3 weeks ago, and wheezing has worsened over this time period. Patient also has had a dry cough x 1 week. Patient has also noted increasing SOB, especially with exertion. Patient has no history of asthma, and does not smoke cigarettes, but does smoke marijuana every day.     Patient denies HA, fever, sore throat, congestion, back pain, chest pain, ab pain, n/v/d/c, urinary symptoms, or changes in bowel movements. Endorses SOB, cough, and wheezing.

## 2017-12-03 NOTE — ED PROVIDER NOTE - ATTENDING CONTRIBUTION TO CARE
I was physically present for the E/M service provided. I agree with above history, physical, and plan which I have reviewed and edited where appropriate. I was physically present for the key portions of the service provided.    37M w/ PMH of FSGS presenting with wheezing x 3 weeks and dry cough x 1 week. Diffuse wheezing on lung exam with moderate air movement, pt speaking in full sentences comfortably. CXR clear. Sx improved with albuterol. Will start steroids and Azithromycin given duration of Sx. f/u outpt with pulmonology for PFTs.

## 2017-12-18 ENCOUNTER — EMERGENCY (EMERGENCY)
Facility: HOSPITAL | Age: 37
LOS: 1 days | Discharge: HOME | End: 2017-12-18
Attending: EMERGENCY MEDICINE | Admitting: EMERGENCY MEDICINE
Payer: COMMERCIAL

## 2017-12-18 VITALS
OXYGEN SATURATION: 98 % | SYSTOLIC BLOOD PRESSURE: 113 MMHG | RESPIRATION RATE: 20 BRPM | DIASTOLIC BLOOD PRESSURE: 66 MMHG | HEART RATE: 80 BPM | TEMPERATURE: 102 F

## 2017-12-18 DIAGNOSIS — S21.302D: Chronic | ICD-10-CM

## 2017-12-18 DIAGNOSIS — Z98.890 OTHER SPECIFIED POSTPROCEDURAL STATES: Chronic | ICD-10-CM

## 2017-12-18 LAB
ALBUMIN SERPL ELPH-MCNC: 4 G/DL — SIGNIFICANT CHANGE UP (ref 3.3–5)
ALP SERPL-CCNC: 54 U/L — SIGNIFICANT CHANGE UP (ref 40–120)
ALT FLD-CCNC: 33 U/L RC — SIGNIFICANT CHANGE UP (ref 10–45)
ANION GAP SERPL CALC-SCNC: 8 MMOL/L — SIGNIFICANT CHANGE UP (ref 5–17)
AST SERPL-CCNC: 29 U/L — SIGNIFICANT CHANGE UP (ref 10–40)
BASE EXCESS BLDV CALC-SCNC: 2.4 MMOL/L — HIGH (ref -2–2)
BASOPHILS # BLD AUTO: 0.1 K/UL — SIGNIFICANT CHANGE UP (ref 0–0.2)
BASOPHILS NFR BLD AUTO: 1.4 % — SIGNIFICANT CHANGE UP (ref 0–2)
BILIRUB SERPL-MCNC: 0.3 MG/DL — SIGNIFICANT CHANGE UP (ref 0.2–1.2)
BUN SERPL-MCNC: 17 MG/DL — SIGNIFICANT CHANGE UP (ref 7–23)
CA-I SERPL-SCNC: 1.21 MMOL/L — SIGNIFICANT CHANGE UP (ref 1.12–1.3)
CALCIUM SERPL-MCNC: 8.9 MG/DL — SIGNIFICANT CHANGE UP (ref 8.4–10.5)
CHLORIDE BLDV-SCNC: 103 MMOL/L — SIGNIFICANT CHANGE UP (ref 96–108)
CHLORIDE SERPL-SCNC: 102 MMOL/L — SIGNIFICANT CHANGE UP (ref 96–108)
CO2 BLDV-SCNC: 30 MMOL/L — SIGNIFICANT CHANGE UP (ref 22–30)
CO2 SERPL-SCNC: 27 MMOL/L — SIGNIFICANT CHANGE UP (ref 22–31)
CREAT SERPL-MCNC: 1.39 MG/DL — HIGH (ref 0.5–1.3)
EOSINOPHIL # BLD AUTO: 0.1 K/UL — SIGNIFICANT CHANGE UP (ref 0–0.5)
EOSINOPHIL NFR BLD AUTO: 1 % — SIGNIFICANT CHANGE UP (ref 0–6)
GAS PNL BLDV: 137 MMOL/L — SIGNIFICANT CHANGE UP (ref 136–145)
GAS PNL BLDV: SIGNIFICANT CHANGE UP
GAS PNL BLDV: SIGNIFICANT CHANGE UP
GLUCOSE BLDV-MCNC: 108 MG/DL — HIGH (ref 70–99)
GLUCOSE SERPL-MCNC: 110 MG/DL — HIGH (ref 70–99)
HCO3 BLDV-SCNC: 28 MMOL/L — SIGNIFICANT CHANGE UP (ref 21–29)
HCOV OC43 RNA SPEC QL NAA+PROBE: DETECTED
HCT VFR BLD CALC: 39.4 % — SIGNIFICANT CHANGE UP (ref 39–50)
HCT VFR BLDA CALC: 39 % — SIGNIFICANT CHANGE UP (ref 39–50)
HGB BLD CALC-MCNC: 12.8 G/DL — LOW (ref 13–17)
HGB BLD-MCNC: 12.7 G/DL — LOW (ref 13–17)
HIV 1 & 2 AB SERPL IA.RAPID: SIGNIFICANT CHANGE UP
LACTATE BLDV-MCNC: 1.3 MMOL/L — SIGNIFICANT CHANGE UP (ref 0.7–2)
LYMPHOCYTES # BLD AUTO: 0.2 K/UL — LOW (ref 1–3.3)
LYMPHOCYTES # BLD AUTO: 3.5 % — LOW (ref 13–44)
MCHC RBC-ENTMCNC: 30.6 PG — SIGNIFICANT CHANGE UP (ref 27–34)
MCHC RBC-ENTMCNC: 32.3 GM/DL — SIGNIFICANT CHANGE UP (ref 32–36)
MCV RBC AUTO: 94.7 FL — SIGNIFICANT CHANGE UP (ref 80–100)
MONOCYTES # BLD AUTO: 0.5 K/UL — SIGNIFICANT CHANGE UP (ref 0–0.9)
MONOCYTES NFR BLD AUTO: 8.5 % — SIGNIFICANT CHANGE UP (ref 2–14)
NEUTROPHILS # BLD AUTO: 5.3 K/UL — SIGNIFICANT CHANGE UP (ref 1.8–7.4)
NEUTROPHILS NFR BLD AUTO: 85.6 % — HIGH (ref 43–77)
PCO2 BLDV: 51 MMHG — HIGH (ref 35–50)
PH BLDV: 7.36 — SIGNIFICANT CHANGE UP (ref 7.35–7.45)
PLATELET # BLD AUTO: 181 K/UL — SIGNIFICANT CHANGE UP (ref 150–400)
PO2 BLDV: 26 MMHG — SIGNIFICANT CHANGE UP (ref 25–45)
POTASSIUM BLDV-SCNC: 4.1 MMOL/L — SIGNIFICANT CHANGE UP (ref 3.5–5)
POTASSIUM SERPL-MCNC: 4.3 MMOL/L — SIGNIFICANT CHANGE UP (ref 3.5–5.3)
POTASSIUM SERPL-SCNC: 4.3 MMOL/L — SIGNIFICANT CHANGE UP (ref 3.5–5.3)
PROT SERPL-MCNC: 6.9 G/DL — SIGNIFICANT CHANGE UP (ref 6–8.3)
RAPID RVP RESULT: DETECTED
RBC # BLD: 4.16 M/UL — LOW (ref 4.2–5.8)
RBC # FLD: 11.3 % — SIGNIFICANT CHANGE UP (ref 10.3–14.5)
SAO2 % BLDV: 40 % — LOW (ref 67–88)
SODIUM SERPL-SCNC: 137 MMOL/L — SIGNIFICANT CHANGE UP (ref 135–145)
WBC # BLD: 6.2 K/UL — SIGNIFICANT CHANGE UP (ref 3.8–10.5)
WBC # FLD AUTO: 6.2 K/UL — SIGNIFICANT CHANGE UP (ref 3.8–10.5)

## 2017-12-18 PROCEDURE — 80053 COMPREHEN METABOLIC PANEL: CPT

## 2017-12-18 PROCEDURE — 82803 BLOOD GASES ANY COMBINATION: CPT

## 2017-12-18 PROCEDURE — 81001 URINALYSIS AUTO W/SCOPE: CPT

## 2017-12-18 PROCEDURE — 87040 BLOOD CULTURE FOR BACTERIA: CPT

## 2017-12-18 PROCEDURE — 82947 ASSAY GLUCOSE BLOOD QUANT: CPT

## 2017-12-18 PROCEDURE — 71020: CPT | Mod: 26

## 2017-12-18 PROCEDURE — 94640 AIRWAY INHALATION TREATMENT: CPT

## 2017-12-18 PROCEDURE — 85027 COMPLETE CBC AUTOMATED: CPT

## 2017-12-18 PROCEDURE — 82435 ASSAY OF BLOOD CHLORIDE: CPT

## 2017-12-18 PROCEDURE — 87486 CHLMYD PNEUM DNA AMP PROBE: CPT

## 2017-12-18 PROCEDURE — 80074 ACUTE HEPATITIS PANEL: CPT

## 2017-12-18 PROCEDURE — 87798 DETECT AGENT NOS DNA AMP: CPT

## 2017-12-18 PROCEDURE — 85014 HEMATOCRIT: CPT

## 2017-12-18 PROCEDURE — 96374 THER/PROPH/DIAG INJ IV PUSH: CPT

## 2017-12-18 PROCEDURE — 99284 EMERGENCY DEPT VISIT MOD MDM: CPT | Mod: 25

## 2017-12-18 PROCEDURE — 84132 ASSAY OF SERUM POTASSIUM: CPT

## 2017-12-18 PROCEDURE — 71046 X-RAY EXAM CHEST 2 VIEWS: CPT

## 2017-12-18 PROCEDURE — 82330 ASSAY OF CALCIUM: CPT

## 2017-12-18 PROCEDURE — 86703 HIV-1/HIV-2 1 RESULT ANTBDY: CPT

## 2017-12-18 PROCEDURE — 87581 M.PNEUMON DNA AMP PROBE: CPT

## 2017-12-18 PROCEDURE — 87633 RESP VIRUS 12-25 TARGETS: CPT

## 2017-12-18 PROCEDURE — 83605 ASSAY OF LACTIC ACID: CPT

## 2017-12-18 PROCEDURE — 84295 ASSAY OF SERUM SODIUM: CPT

## 2017-12-18 RX ORDER — IPRATROPIUM/ALBUTEROL SULFATE 18-103MCG
3 AEROSOL WITH ADAPTER (GRAM) INHALATION ONCE
Qty: 0 | Refills: 0 | Status: COMPLETED | OUTPATIENT
Start: 2017-12-18 | End: 2017-12-18

## 2017-12-18 RX ORDER — ACETAMINOPHEN 500 MG
1000 TABLET ORAL ONCE
Qty: 0 | Refills: 0 | Status: COMPLETED | OUTPATIENT
Start: 2017-12-18 | End: 2017-12-18

## 2017-12-18 RX ORDER — SODIUM CHLORIDE 9 MG/ML
1000 INJECTION INTRAMUSCULAR; INTRAVENOUS; SUBCUTANEOUS ONCE
Qty: 0 | Refills: 0 | Status: COMPLETED | OUTPATIENT
Start: 2017-12-18 | End: 2017-12-18

## 2017-12-18 RX ADMIN — SODIUM CHLORIDE 1000 MILLILITER(S): 9 INJECTION INTRAMUSCULAR; INTRAVENOUS; SUBCUTANEOUS at 22:26

## 2017-12-18 RX ADMIN — Medication 3 MILLILITER(S): at 22:32

## 2017-12-18 RX ADMIN — Medication 400 MILLIGRAM(S): at 22:26

## 2017-12-18 NOTE — ED ADULT NURSE NOTE - OBJECTIVE STATEMENT
36 y/o male PMH kidney disease presents to ED c/o fever, chills, generalized body aches, cough x 3 days. Pt states he was in hospital "for lungs" and similar symptoms a few weeks ago, and was supposed to follow up with lung specialist, but didn't. Pt says symptoms have worsened. Also reports lower back pain since for few weeks, had kidney biopsy which came back normal. States he had headache today. Subjective fever, did not take temp. Denies SOB, chest pain. Pt lungs clear b/l. Skin warm, dry, intact. Gross motor and neuro intact. Pt safety and comfort provided. Family at bedside.

## 2017-12-18 NOTE — ED PROVIDER NOTE - ATTENDING CONTRIBUTION TO CARE
36 yo M presents with 2-3 days of tactile fever, rhinorrhea, nasal congestion, cough and body aches. Cough is always nonproductive. He did not measure temp at home, but ni the ER he has a fever of 102.9. He has a h/o FSGS. He denies any headache, neck pain/stiffness, photophobia, ches tpain, sob, abd pain, N/V/D, or any urinary complaitns. He has not taken any antipyretics. he was admitted to the hospital few weeks ago with wheezing/reactive airway disease.   PE well appearing, no meningeal symptoms. no neck stiffness, full mobility. lungs are CTA. abd soft NT.   VS with fever. 36 yo M presents with 2-3 days of tactile fever, rhinorrhea, nasal congestion, cough and body aches. Cough is always nonproductive. He did not measure temp at home, but ni the ER he has a fever of 102.9. He has a h/o FSGS. He denies any headache, neck pain/stiffness, photophobia, ches tpain, sob, abd pain, N/V/D, or any urinary complaitns. He has not taken any antipyretics. he was admitted to the hospital few weeks ago with wheezing/reactive airway disease, he reports resolution of these symptoms.   PE well appearing, no meningeal symptoms. no neck stiffness, full mobility. lungs are CTA. abd soft NT.   VS with fever. VS otherwise normal.  ED workup reveals CXR with clear lungs, HIV nonreactive, UA negative. h/H 13 and 39. no leukocytosis. normal lactate. mild GIFTY with Cr 1.39, slightly increased from prior. patient was hydrated with IVFs and given tylenol for fever. repeat vital signs normalized. I recommended that we obtain repeat BMP to eval for improvement of renal function, but pt declined repeat Cr check. + coronavirus. given fevers, blood cultures drawn and pending. patietn is well appearing with stable VS, I do believe pt is safe to be discharged at this time, seems htat source of fever likely only coronvirus. pt discharged with instructions to drink plenty of fluids, tylenol for fever, and f/u with PMD in 1-2 days for repeat eval and further management    The patient was discharged from the ED in stable condition. All results of today's workup were discussed with the patient and all questions/concerns were addressed. All discharge instructions were thoroughly discussed with the patient, as well as important warning signs and new/ worsening symptoms which should necessitate patient's immediate return to the ED. The patient is agreeable with discharge and expresses full understanding of all instructions given.

## 2017-12-18 NOTE — ED PROVIDER NOTE - OBJECTIVE STATEMENT
37M h/o FSGS presents with 5 days of progressive fevers, chills, body aches. He has had a few weeks of progressive wheezing and nonproductive cough, for which he was seen in the ED. He has not yet followed up with Pulm or Nephro. He also admits to some back pain, no urinary or fecal incontinence, no IVDA. Has no other complaints, no N/V/D, dysuria, hematuria, leg pain/swelling, recent travel. 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 37M h/o FSGS presents with 2-3 days of fevers, chills, body aches, and URI symptoms, and dry cough. A few weeks he was seen in the ED for wheezing, these symptoms have resolved. He has not yet followed up with Pulm or Nephro. no IVDA. Has no other complaints, no N/V/D, dysuria, hematuria, leg pain/swelling, recent travel. 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

## 2017-12-18 NOTE — ED PROVIDER NOTE - CARE PLAN
Principal Discharge DX:	Fever, unspecified fever cause  Secondary Diagnosis:	Coronavirus infection  Secondary Diagnosis:	FSGS (focal segmental glomerulosclerosis)

## 2017-12-18 NOTE — ED PROVIDER NOTE - MEDICAL DECISION MAKING DETAILS
37M p/w fever and body aches. Given symptoms and history will get HIV, Hepatitis. Otherwise will look for source of infection. CBC, CMP, CXR, UA, RVP. 37M p/w fever, body aches, and URI symptoms. . Otherwise will look for source of infection. CBC, CMP, CXR, UA, RVP, HIV, hepatitis panel

## 2017-12-19 ENCOUNTER — APPOINTMENT (OUTPATIENT)
Dept: INTERNAL MEDICINE | Facility: CLINIC | Age: 37
End: 2017-12-19
Payer: COMMERCIAL

## 2017-12-19 ENCOUNTER — APPOINTMENT (OUTPATIENT)
Dept: INTERNAL MEDICINE | Facility: CLINIC | Age: 37
End: 2017-12-19

## 2017-12-19 VITALS — TEMPERATURE: 101.3 F | DIASTOLIC BLOOD PRESSURE: 68 MMHG | WEIGHT: 167 LBS | SYSTOLIC BLOOD PRESSURE: 126 MMHG

## 2017-12-19 VITALS
TEMPERATURE: 101.3 F | SYSTOLIC BLOOD PRESSURE: 126 MMHG | BODY MASS INDEX: 20.34 KG/M2 | HEIGHT: 76 IN | DIASTOLIC BLOOD PRESSURE: 68 MMHG | WEIGHT: 167 LBS

## 2017-12-19 VITALS
HEART RATE: 71 BPM | DIASTOLIC BLOOD PRESSURE: 55 MMHG | RESPIRATION RATE: 20 BRPM | SYSTOLIC BLOOD PRESSURE: 115 MMHG | TEMPERATURE: 99 F | OXYGEN SATURATION: 99 %

## 2017-12-19 DIAGNOSIS — D63.1 CHRONIC KIDNEY DISEASE, STAGE 2 (MILD): ICD-10-CM

## 2017-12-19 DIAGNOSIS — N18.2 CHRONIC KIDNEY DISEASE, STAGE 2 (MILD): ICD-10-CM

## 2017-12-19 LAB
APPEARANCE UR: CLEAR — SIGNIFICANT CHANGE UP
BILIRUB UR-MCNC: NEGATIVE — SIGNIFICANT CHANGE UP
COLOR SPEC: YELLOW — SIGNIFICANT CHANGE UP
DIFF PNL FLD: NEGATIVE — SIGNIFICANT CHANGE UP
EPI CELLS # UR: SIGNIFICANT CHANGE UP /HPF
GLUCOSE UR QL: NEGATIVE — SIGNIFICANT CHANGE UP
HAV IGM SER-ACNC: SIGNIFICANT CHANGE UP
HBV CORE IGM SER-ACNC: SIGNIFICANT CHANGE UP
HBV SURFACE AG SER-ACNC: SIGNIFICANT CHANGE UP
HCV AB S/CO SERPL IA: 0.08 S/CO — SIGNIFICANT CHANGE UP
HCV AB SERPL-IMP: SIGNIFICANT CHANGE UP
KETONES UR-MCNC: NEGATIVE — SIGNIFICANT CHANGE UP
LEUKOCYTE ESTERASE UR-ACNC: NEGATIVE — SIGNIFICANT CHANGE UP
NITRITE UR-MCNC: NEGATIVE — SIGNIFICANT CHANGE UP
PH UR: 6.5 — SIGNIFICANT CHANGE UP (ref 5–8)
PROT UR-MCNC: 300 MG/DL
SP GR SPEC: 1.02 — SIGNIFICANT CHANGE UP (ref 1.01–1.02)
UROBILINOGEN FLD QL: NEGATIVE — SIGNIFICANT CHANGE UP
WBC UR QL: SIGNIFICANT CHANGE UP /HPF (ref 0–5)

## 2017-12-19 PROCEDURE — 99213 OFFICE O/P EST LOW 20 MIN: CPT

## 2017-12-22 ENCOUNTER — APPOINTMENT (OUTPATIENT)
Dept: NEPHROLOGY | Facility: CLINIC | Age: 37
End: 2017-12-22
Payer: COMMERCIAL

## 2017-12-22 VITALS
DIASTOLIC BLOOD PRESSURE: 67 MMHG | OXYGEN SATURATION: 97 % | BODY MASS INDEX: 20 KG/M2 | HEART RATE: 94 BPM | HEIGHT: 76 IN | SYSTOLIC BLOOD PRESSURE: 97 MMHG | WEIGHT: 164.24 LBS

## 2017-12-22 LAB
ALBUMIN SERPL ELPH-MCNC: 3.4 G/DL
ANION GAP SERPL CALC-SCNC: 14 MMOL/L
BUN SERPL-MCNC: 18 MG/DL
CALCIUM SERPL-MCNC: 9.2 MG/DL
CHLORIDE SERPL-SCNC: 102 MMOL/L
CO2 SERPL-SCNC: 23 MMOL/L
CREAT SERPL-MCNC: 1.65 MG/DL
GLUCOSE SERPL-MCNC: 114 MG/DL
PHOSPHATE SERPL-MCNC: 3.3 MG/DL
POTASSIUM SERPL-SCNC: 4.3 MMOL/L
SODIUM SERPL-SCNC: 139 MMOL/L

## 2017-12-22 PROCEDURE — 99214 OFFICE O/P EST MOD 30 MIN: CPT

## 2017-12-22 RX ORDER — LISINOPRIL 2.5 MG/1
2.5 TABLET ORAL DAILY
Qty: 30 | Refills: 2 | Status: DISCONTINUED | COMMUNITY
Start: 2017-08-08 | End: 2017-12-22

## 2017-12-24 LAB
CULTURE RESULTS: SIGNIFICANT CHANGE UP
CULTURE RESULTS: SIGNIFICANT CHANGE UP
SPECIMEN SOURCE: SIGNIFICANT CHANGE UP
SPECIMEN SOURCE: SIGNIFICANT CHANGE UP

## 2017-12-28 ENCOUNTER — APPOINTMENT (OUTPATIENT)
Dept: INTERNAL MEDICINE | Facility: CLINIC | Age: 37
End: 2017-12-28
Payer: COMMERCIAL

## 2017-12-28 VITALS
BODY MASS INDEX: 20.33 KG/M2 | OXYGEN SATURATION: 99 % | SYSTOLIC BLOOD PRESSURE: 100 MMHG | WEIGHT: 167 LBS | HEART RATE: 57 BPM | DIASTOLIC BLOOD PRESSURE: 70 MMHG

## 2017-12-28 LAB
ALBUMIN SERPL ELPH-MCNC: 3.2 G/DL
ANION GAP SERPL CALC-SCNC: 13 MMOL/L
APPEARANCE: CLEAR
B19V DNA FLD QL NAA+PROBE: NORMAL
BACTERIA: NEGATIVE
BILIRUBIN URINE: ABNORMAL
BLOOD URINE: ABNORMAL
BUN SERPL-MCNC: 19 MG/DL
CALCIUM SERPL-MCNC: 8.7 MG/DL
CHLORIDE SERPL-SCNC: 100 MMOL/L
CO2 SERPL-SCNC: 26 MMOL/L
COLOR: ABNORMAL
CREAT SERPL-MCNC: 1.38 MG/DL
CREAT SPEC-SCNC: 165 MG/DL
CREAT SPEC-SCNC: 569 MG/DL
CREAT/PROT UR: 1.1 RATIO
CREAT/PROT UR: 2 RATIO
GLUCOSE QUALITATIVE U: NEGATIVE MG/DL
GLUCOSE SERPL-MCNC: 76 MG/DL
GRANULAR CASTS: 1 /LPF
HYALINE CASTS: 5 /LPF
KETONES URINE: ABNORMAL
LEUKOCYTE ESTERASE URINE: NEGATIVE
MICROSCOPIC-UA: NORMAL
NITRITE URINE: NEGATIVE
PH URINE: 5.5
PHOSPHATE SERPL-MCNC: 2.7 MG/DL
POTASSIUM SERPL-SCNC: 4.4 MMOL/L
PROT UR-MCNC: 1113 MG/DL
PROT UR-MCNC: 185 MG/DL
PROTEIN URINE: >300 MG/DL
RED BLOOD CELLS URINE: 7 /HPF
SODIUM SERPL-SCNC: 139 MMOL/L
SPECIFIC GRAVITY URINE: 1.04
SQUAMOUS EPITHELIAL CELLS: 10 /HPF
UROBILINOGEN URINE: NEGATIVE MG/DL
WHITE BLOOD CELLS URINE: 5 /HPF

## 2017-12-28 PROCEDURE — 99213 OFFICE O/P EST LOW 20 MIN: CPT

## 2017-12-31 ENCOUNTER — EMERGENCY (EMERGENCY)
Facility: HOSPITAL | Age: 37
LOS: 1 days | Discharge: HOME | End: 2017-12-31
Attending: EMERGENCY MEDICINE | Admitting: EMERGENCY MEDICINE
Payer: COMMERCIAL

## 2017-12-31 VITALS
HEART RATE: 74 BPM | RESPIRATION RATE: 20 BRPM | OXYGEN SATURATION: 99 % | DIASTOLIC BLOOD PRESSURE: 83 MMHG | TEMPERATURE: 98 F | SYSTOLIC BLOOD PRESSURE: 124 MMHG

## 2017-12-31 DIAGNOSIS — Z98.890 OTHER SPECIFIED POSTPROCEDURAL STATES: Chronic | ICD-10-CM

## 2017-12-31 DIAGNOSIS — S21.302D: Chronic | ICD-10-CM

## 2017-12-31 PROCEDURE — 71046 X-RAY EXAM CHEST 2 VIEWS: CPT

## 2017-12-31 PROCEDURE — 93010 ELECTROCARDIOGRAM REPORT: CPT | Mod: NC

## 2017-12-31 PROCEDURE — 71020: CPT | Mod: 26

## 2017-12-31 PROCEDURE — 99284 EMERGENCY DEPT VISIT MOD MDM: CPT | Mod: 25

## 2017-12-31 PROCEDURE — 99283 EMERGENCY DEPT VISIT LOW MDM: CPT | Mod: 25

## 2017-12-31 PROCEDURE — 93005 ELECTROCARDIOGRAM TRACING: CPT

## 2017-12-31 RX ORDER — ACETAMINOPHEN 500 MG
975 TABLET ORAL ONCE
Qty: 0 | Refills: 0 | Status: COMPLETED | OUTPATIENT
Start: 2017-12-31 | End: 2017-12-31

## 2017-12-31 RX ADMIN — Medication 975 MILLIGRAM(S): at 23:49
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
## 2017-12-31 NOTE — ED ADULT NURSE NOTE - OBJECTIVE STATEMENT
Patient presents with c/o cough Patient presents with c/o unproductive cough x1 day with associated L upper chest pain that began after coughing.  Patient recently diagnosed with URI.  Patient denies SOB, dyspnea on exertion, edema, numbness or tingling.

## 2017-12-31 NOTE — ED PROVIDER NOTE - MEDICAL DECISION MAKING DETAILS
see attending note ------------ATTENDING NOTE------------   36 yo M w/ family c/o >24 hrs of constant mild/moderate stabbing ache in L upper chest, started after coughing fit, describes unproductive cough and recent dx corona viral uri, no fevers, no exertional symptoms, very comfortable appearing in ED and walking w/o sob/distress, clear chest, nml VS (HR 70's, RR 14), significant past cardiac trauma but very likely chest wall / msk related, EKG by triage w/ new LAD but very low suspicion any acute cardiac process, in depth d/w all about ddx, tx, dickson, close outpt fu.  - Conor Galvez MD   -----------------------------------------------------------------

## 2018-01-16 ENCOUNTER — APPOINTMENT (OUTPATIENT)
Dept: INTERNAL MEDICINE | Facility: CLINIC | Age: 38
End: 2018-01-16

## 2018-01-18 ENCOUNTER — TRANSCRIPTION ENCOUNTER (OUTPATIENT)
Age: 38
End: 2018-01-18

## 2018-01-20 NOTE — H&P ADULT - NSHPPOACENTRALVENOUSCATHETER_GEN_ALL_CORE
no

## 2018-01-22 ENCOUNTER — APPOINTMENT (OUTPATIENT)
Dept: INTERNAL MEDICINE | Facility: CLINIC | Age: 38
End: 2018-01-22
Payer: COMMERCIAL

## 2018-01-22 VITALS
BODY MASS INDEX: 21.92 KG/M2 | HEIGHT: 76 IN | WEIGHT: 180 LBS | HEART RATE: 61 BPM | DIASTOLIC BLOOD PRESSURE: 70 MMHG | SYSTOLIC BLOOD PRESSURE: 110 MMHG

## 2018-01-22 DIAGNOSIS — J20.8 ACUTE BRONCHITIS DUE TO OTHER SPECIFIED ORGANISMS: ICD-10-CM

## 2018-01-22 DIAGNOSIS — R94.31 ABNORMAL ELECTROCARDIOGRAM [ECG] [EKG]: ICD-10-CM

## 2018-01-22 PROCEDURE — 99213 OFFICE O/P EST LOW 20 MIN: CPT

## 2018-06-15 ENCOUNTER — APPOINTMENT (OUTPATIENT)
Dept: NEPHROLOGY | Facility: CLINIC | Age: 38
End: 2018-06-15

## 2018-07-06 ENCOUNTER — APPOINTMENT (OUTPATIENT)
Dept: NEPHROLOGY | Facility: CLINIC | Age: 38
End: 2018-07-06
Payer: COMMERCIAL

## 2018-07-06 VITALS — SYSTOLIC BLOOD PRESSURE: 100 MMHG | DIASTOLIC BLOOD PRESSURE: 60 MMHG

## 2018-07-06 VITALS — BODY MASS INDEX: 20.94 KG/M2 | WEIGHT: 171.96 LBS | OXYGEN SATURATION: 96 % | HEART RATE: 73 BPM | HEIGHT: 76 IN

## 2018-07-06 PROCEDURE — 99214 OFFICE O/P EST MOD 30 MIN: CPT

## 2018-07-09 ENCOUNTER — MED ADMIN CHARGE (OUTPATIENT)
Age: 38
End: 2018-07-09

## 2018-07-09 LAB
ALBUMIN SERPL ELPH-MCNC: 4.6 G/DL
ANION GAP SERPL CALC-SCNC: 12 MMOL/L
BUN SERPL-MCNC: 24 MG/DL
CALCIUM SERPL-MCNC: 9.4 MG/DL
CHLORIDE SERPL-SCNC: 103 MMOL/L
CO2 SERPL-SCNC: 23 MMOL/L
CREAT SERPL-MCNC: 1.34 MG/DL
CREAT SPEC-SCNC: 136 MG/DL
CREAT/PROT UR: 0.6 RATIO
GLUCOSE SERPL-MCNC: 100 MG/DL
PHOSPHATE SERPL-MCNC: 3.4 MG/DL
POTASSIUM SERPL-SCNC: 4.7 MMOL/L
PROT UR-MCNC: 85 MG/DL
SODIUM SERPL-SCNC: 138 MMOL/L

## 2018-07-22 PROBLEM — Z80.0 FAMILY HISTORY OF COLON CANCER: Status: INACTIVE | Noted: 2017-09-13

## 2019-01-14 ENCOUNTER — APPOINTMENT (OUTPATIENT)
Dept: NEPHROLOGY | Facility: CLINIC | Age: 39
End: 2019-01-14
Payer: COMMERCIAL

## 2019-01-14 VITALS
DIASTOLIC BLOOD PRESSURE: 68 MMHG | HEIGHT: 76 IN | SYSTOLIC BLOOD PRESSURE: 116 MMHG | BODY MASS INDEX: 21.21 KG/M2 | WEIGHT: 174.16 LBS | OXYGEN SATURATION: 98 % | HEART RATE: 63 BPM

## 2019-01-14 PROCEDURE — 99214 OFFICE O/P EST MOD 30 MIN: CPT

## 2019-01-14 NOTE — HISTORY OF PRESENT ILLNESS
[FreeTextEntry1] : Mr Vik Bone is a 37 years old   whom I originally met at the [facility] where he presented with GIFTY and proteinuria with acute polyarthralgia.  At that time he was diagnosed with parvovirus-associated collapsing glomerulopathy.  He was re-admitted at [facility] for a five day treatment of intravenous immunoglobulin to help treat parvo-virus.  The patients' viral load came down from 500,000 copies pre-treatment to 11,000 copies post treatment.  His proteinuria peaked at 7 grams and then to 1.3 grams.  He feels somewhat better today upon treatment.  We started him on an ACE - I but dose was reduced at last visit for hypotension.\par \par 8/24 The patient is seen today for follow up.  He has a number of complaints.  He still feels tired but improved.  He has good appetite but has continued weight loss.  He has noted progressive pain at the site of the old biopsy.  \par \par 9/1 Patient feels improved since last visit.  We decreased his ACE I dose last visit.  Today we discussed in depth about various social aspects of his life including how he grew up in a harsh neighborhood in [place].  His wife also disclosed that she has been undergoing a lot of stress as well.  The patient denies any SOB fatigue is improved.  At my recommendation last time he cut out marijuana.  It does not seem that the patient went for the CT scan that was ordered last time.\par \par 12/22 Since our last visit the patient was in the ED complaining of fever and was found to be corona virus positive.  He feels better now but still has not had a good appetite.\par \par 7/6/18 Vik came back to see me now a few months later.  He has felt well recently but admits that he has been drinking again.  He also admits that he hasn't taken his lisinopril in nearly two months.  \par \par 1/14/19 I saw Vik today and he is doing well overall.  He has not taken his medications.  He has been feeling well overall.  He has been wheezing a lot at night.  He isn't wearing a mask near the concrete dust at his work.  He feels short of breath at times but it is alleviated by his inhaler.

## 2019-01-14 NOTE — ASSESSMENT
[FreeTextEntry1] : Mr Bone is a 38 years old man with collapsing glomerulopathy seen today for follow up.\par \par Collapsing Glomerulopathy -- The patient's creatinine has been stable.   On evaluation today he has no edema. I will check a renal panel today and a protein/creatinine ratio today and will restart the lisinopril. \par \par Wheezing -- Sounds like bronchospasm, relieved with inhaler.  referred to pulmonary\par \par RTO six months.

## 2019-01-14 NOTE — PHYSICAL EXAM
[General Appearance - Alert] : alert [General Appearance - In No Acute Distress] : in no acute distress [General Appearance - Well Nourished] : well nourished [General Appearance - Well Developed] : well developed [Sclera] : the sclera and conjunctiva were normal [Hearing Threshold Finger Rub Not [place]] : hearing was normal [Examination Of The Oral Cavity] : the lips and gums were normal [Oropharynx] : the oropharynx was normal [Neck Appearance] : the appearance of the neck was normal [Neck Cervical Mass (___cm)] : no neck mass was observed [Jugular Venous Distention Increased] : there was no jugular-venous distention [Thyroid Diffuse Enlargement] : the thyroid was not enlarged [Respiration, Rhythm And Depth] : normal respiratory rhythm and effort [Exaggerated Use Of Accessory Muscles For Inspiration] : no accessory muscle use [Auscultation Breath Sounds / Voice Sounds] : lungs were clear to auscultation bilaterally [Heart Sounds] : normal S1 and S2 [Heart Sounds Gallop] : no gallops [Murmurs] : no murmurs [Heart Sounds Pericardial Friction Rub] : no pericardial rub [Edema] : there was no peripheral edema [Abdomen Soft] : soft [Abdomen Tenderness] : non-tender [] : no hepato-splenomegaly [Cervical Lymph Nodes Enlarged Posterior Bilaterally] : posterior cervical [Cervical Lymph Nodes Enlarged Anterior Bilaterally] : anterior cervical [Supraclavicular Lymph Nodes Enlarged Bilaterally] : supraclavicular [No CVA Tenderness] : no ~M costovertebral angle tenderness [No Spinal Tenderness] : no spinal tenderness [Abnormal Walk] : normal gait [Oriented To Time, Place, And Person] : oriented to person, place, and time [Impaired Insight] : insight and judgment were intact [Affect] : the affect was normal [Mood] : the mood was normal

## 2019-01-14 NOTE — REVIEW OF SYSTEMS
[Shortness Of Breath] : shortness of breath [Fever] : no fever [Chills] : no chills [Feeling Poorly] : not feeling poorly [Feeling Tired] : not feeling tired [Eyesight Problems] : no eyesight problems [Nosebleeds] : no nosebleeds [Chest Pain] : no chest pain [Palpitations] : no palpitations [Leg Claudication] : no intermittent leg claudication [Lower Ext Edema] : no extremity edema [Abdominal Pain] : no abdominal pain [Vomiting] : no vomiting [Constipation] : no constipation [Diarrhea] : no diarrhea [Dysuria] : no dysuria [Incontinence] : no incontinence [Arthralgias] : no arthralgias [Joint Pain] : no joint pain [Dizziness] : no dizziness [Fainting] : no fainting [Anxiety] : no anxiety [Depression] : no depression [Easy Bleeding] : no tendency for easy bleeding [Easy Bruising] : no tendency for easy bruising

## 2019-01-15 ENCOUNTER — MOBILE ON CALL (OUTPATIENT)
Age: 39
End: 2019-01-15

## 2019-01-16 LAB
ALBUMIN SERPL ELPH-MCNC: 4.3 G/DL
ANION GAP SERPL CALC-SCNC: 12 MMOL/L
BUN SERPL-MCNC: 18 MG/DL
CALCIUM SERPL-MCNC: 9.4 MG/DL
CHLORIDE SERPL-SCNC: 103 MMOL/L
CO2 SERPL-SCNC: 26 MMOL/L
CREAT SERPL-MCNC: 1.18 MG/DL
CREAT SPEC-SCNC: 156 MG/DL
CREAT/PROT UR: 0.5 RATIO
GLUCOSE SERPL-MCNC: 82 MG/DL
PHOSPHATE SERPL-MCNC: 3.3 MG/DL
POTASSIUM SERPL-SCNC: 4.9 MMOL/L
PROT UR-MCNC: 76 MG/DL
SODIUM SERPL-SCNC: 141 MMOL/L

## 2019-01-17 PROBLEM — N05.1 UNSPECIFIED NEPHRITIC SYNDROME WITH FOCAL AND SEGMENTAL GLOMERULAR LESIONS: Chronic | Status: ACTIVE | Noted: 2017-12-03

## 2019-01-24 ENCOUNTER — APPOINTMENT (OUTPATIENT)
Dept: INTERNAL MEDICINE | Facility: CLINIC | Age: 39
End: 2019-01-24
Payer: COMMERCIAL

## 2019-01-24 VITALS
BODY MASS INDEX: 22.65 KG/M2 | DIASTOLIC BLOOD PRESSURE: 60 MMHG | HEART RATE: 74 BPM | OXYGEN SATURATION: 96 % | HEIGHT: 76 IN | WEIGHT: 186 LBS | SYSTOLIC BLOOD PRESSURE: 102 MMHG

## 2019-01-24 DIAGNOSIS — Z00.00 ENCOUNTER FOR GENERAL ADULT MEDICAL EXAMINATION W/OUT ABNORMAL FINDINGS: ICD-10-CM

## 2019-01-24 DIAGNOSIS — R22.9 LOCALIZED SWELLING, MASS AND LUMP, UNSPECIFIED: ICD-10-CM

## 2019-01-24 DIAGNOSIS — R06.2 WHEEZING: ICD-10-CM

## 2019-01-24 PROCEDURE — 99213 OFFICE O/P EST LOW 20 MIN: CPT

## 2019-01-24 NOTE — HISTORY OF PRESENT ILLNESS
[Spouse] : spouse [FreeTextEntry1] : followup [de-identified] : \par Pt reports doing well overall. He has some sporadic pain on the right thoracic back region. It feels like "heat" and occurs while driving. In addition: pt is wheezing also at night lately. He recently saw Nephrology Dr Collazo for followup and his renal panel looked quite good.

## 2019-01-24 NOTE — ASSESSMENT
[FreeTextEntry1] : Right thoracic muscle spasm - advised heating packs or bengay cream prn and try to improve posture\par \par advised pt to do his pending testing and f/u within 1 month for his CPE

## 2019-01-24 NOTE — REVIEW OF SYSTEMS
[Nasal Discharge] : nasal discharge [Shortness Of Breath] : shortness of breath [Wheezing] : wheezing [Fever] : no fever [Chest Pain] : no chest pain

## 2019-01-24 NOTE — PHYSICAL EXAM
[No Acute Distress] : no acute distress [Supple] : supple [No Respiratory Distress] : no respiratory distress  [Clear to Auscultation] : lungs were clear to auscultation bilaterally [No Accessory Muscle Use] : no accessory muscle use [Normal Rate] : normal rate  [Regular Rhythm] : with a regular rhythm [Normal S1, S2] : normal S1 and S2 [Soft] : abdomen soft [Non Tender] : non-tender [Normal Bowel Sounds] : normal bowel sounds [de-identified] : posterior pharyngeal cobblestoning

## 2019-02-13 ENCOUNTER — APPOINTMENT (OUTPATIENT)
Dept: PEDIATRIC ALLERGY IMMUNOLOGY | Facility: CLINIC | Age: 39
End: 2019-02-13
Payer: COMMERCIAL

## 2019-02-13 ENCOUNTER — NON-APPOINTMENT (OUTPATIENT)
Age: 39
End: 2019-02-13

## 2019-02-13 VITALS
BODY MASS INDEX: 29.7 KG/M2 | HEART RATE: 56 BPM | SYSTOLIC BLOOD PRESSURE: 125 MMHG | HEIGHT: 64 IN | WEIGHT: 173.99 LBS | DIASTOLIC BLOOD PRESSURE: 67 MMHG | OXYGEN SATURATION: 95 %

## 2019-02-13 DIAGNOSIS — T78.1XXA OTHER ADVERSE FOOD REACTIONS, NOT ELSEWHERE CLASSIFIED, INITIAL ENCOUNTER: ICD-10-CM

## 2019-02-13 DIAGNOSIS — J45.40 MODERATE PERSISTENT ASTHMA, UNCOMPLICATED: ICD-10-CM

## 2019-02-13 DIAGNOSIS — Z01.89 ENCOUNTER FOR OTHER SPECIFIED SPECIAL EXAMINATIONS: ICD-10-CM

## 2019-02-13 DIAGNOSIS — J30.1 ALLERGIC RHINITIS DUE TO POLLEN: ICD-10-CM

## 2019-02-13 DIAGNOSIS — J30.81 ALLERGIC RHINITIS DUE TO ANIMAL (CAT) (DOG) HAIR AND DANDER: ICD-10-CM

## 2019-02-13 PROCEDURE — 95004 PERQ TESTS W/ALRGNC XTRCS: CPT

## 2019-02-13 PROCEDURE — 94060 EVALUATION OF WHEEZING: CPT

## 2019-02-13 PROCEDURE — 99244 OFF/OP CNSLTJ NEW/EST MOD 40: CPT | Mod: 25

## 2019-02-13 RX ORDER — EPINEPHRINE 0.3 MG/.3ML
0.3 INJECTION INTRAMUSCULAR
Qty: 2 | Refills: 0 | Status: ACTIVE | COMMUNITY
Start: 2019-02-13 | End: 1900-01-01

## 2019-02-13 NOTE — HISTORY OF PRESENT ILLNESS
[Eczematous rashes] : eczematous rashes [Venom Reactions] : venom reactions [de-identified] : Vik is a 39 yo male with history of kidney disease who is here for initial evaluation of wheezing, adverse food reaction and environmental allergies. 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 No history of intubations, hospitalizations for asthma. \par Adverse food reaction: Vik states that he is allergic to certain fruits, oranges, strawberries, apples. He says that he has tingling in  his mouth and lips when he is exposed to these fruits, hasn't tried them in cooked form, but eats apples when he cleans the skin. The same thing was happening with bananas, but now he tolerates bananas with no issues.  Otherwise he consumes dairy, peanuts, tree nuts, fish, shellfish, soy, wheat, seeds. \par Environmental allergies: has congestion all year round, but worse in spring. Doesn't like to use any medications for it.

## 2019-02-13 NOTE — IMPRESSION
[Allergy Testing Dust Mite] : dust mites [Allergy Testing Mixed Feathers] : feathers [Allergy Testing Cockroach] : cockroach [Allergy Testing Dog] : dog [Allergy Testing Cat] : cat [Allergy Testing Trees] : trees [Allergy Testing Weeds] : weeds [Allergy Testing Grasses] : grasses [] : molds [Spirometry] : Spirometry [Moderate] : (moderate) [Reversible] :  with reversibility.

## 2019-02-13 NOTE — REVIEW OF SYSTEMS
[Immunizations are up to date] : Immunizations are up to date [Nasal Congestion] : nasal congestion [Wheezing] : wheezing [Nl] : Genitourinary [Received Influenza Vaccine this Past Year] : patient has not received the Influenza vaccine this past year

## 2019-02-13 NOTE — PHYSICAL EXAM
[Alert] : alert [Well Nourished] : well nourished [Healthy Appearance] : healthy appearance [No Acute Distress] : no acute distress [Well Developed] : well developed [Normal Pupil & Iris Size/Symmetry] : normal pupil and iris size and symmetry [No Discharge] : no discharge [No Photophobia] : no photophobia [Sclera Not Icteric] : sclera not icteric [Suborbital Bogginess] : suborbital bogginess (allergic shiners) [Normal TMs] : both tympanic membranes were normal [Normal Nasal Mucosa] : the nasal mucosa was normal [Normal Lips/Tongue] : the lips and tongue were normal [Normal Outer Ear/Nose] : the ears and nose were normal in appearance [Normal Tonsils] : normal tonsils [No Thrush] : no thrush [Normal Dentition] : normal dentition [No Oral Lesions or Ulcers] : no oral lesions or ulcers [Boggy Nasal Turbinates] : boggy and/or pale nasal turbinates [Posterior Pharyngeal Cobblestoning] : posterior pharyngeal cobblestoning [Supple] : the neck was supple [Normal Rate and Effort] : normal respiratory rhythm and effort [Normal Palpation] : palpation of the chest revealed no abnormalities [No Crackles] : no crackles [No Retractions] : no retractions [Bilateral Audible Breath Sounds] : bilateral audible breath sounds [Normal Rate] : heart rate was normal  [Normal S1, S2] : normal S1 and S2 [Regular Rhythm] : with a regular rhythm [Soft] : abdomen soft [Not Tender] : non-tender [Not Distended] : not distended [No HSM] : no hepato-splenomegaly [Normal Cervical Lymph Nodes] : cervical [Normal Axillary Lumph Nodes] : axillary [Skin Intact] : skin intact  [No Rash] : no rash [No Skin Lesions] : no skin lesions [No Joint Swelling or Erythema] : no joint swelling or erythema [No clubbing] : no clubbing [No Edema] : no edema [No Cyanosis] : no cyanosis [Normal Mood] : mood was normal [Normal Affect] : affect was normal [Alert, Awake, Oriented as Age-Appropriate] : alert, awake, oriented as age appropriate [Wheezing] : no wheezing was heard [Eczematous Patches] : no eczematous patches [Xerosis] : no xerosis

## 2019-02-13 NOTE — CONSULT LETTER
[Dear  ___] : Dear  [unfilled], [Consult Letter:] : I had the pleasure of evaluating your patient, [unfilled]. [Please see my note below.] : Please see my note below. [Consult Closing:] : Thank you very much for allowing me to participate in the care of this patient.  If you have any questions, please do not hesitate to contact me. [Sincerely,] : Sincerely, [FreeTextEntry2] : NANCIE MCKENNA [FreeTextEntry3] : Juanita Tanner MD\par Attending Physician, Division of Allergy/Immunology\par [facility]

## 2019-03-21 ENCOUNTER — APPOINTMENT (OUTPATIENT)
Dept: INTERNAL MEDICINE | Facility: CLINIC | Age: 39
End: 2019-03-21

## 2019-03-25 ENCOUNTER — TRANSCRIPTION ENCOUNTER (OUTPATIENT)
Age: 39
End: 2019-03-25

## 2019-04-25 NOTE — ED PROVIDER NOTE - ATTENDING CONTRIBUTION TO CARE
The patient is a 2y11m Male complaining of cough. ------------ATTENDING NOTE------------   38 yo M w/ family c/o >24 hrs of constant mild/moderate stabbing ache in L upper chest, started after coughing fit, describes unproductive cough and recent dx corona viral uri, no fevers, no exertional symptoms, very comfortable appearing in ED and walking w/o sob/distress, clear chest, nml VS (HR 70's, RR 14), very likely chest wall / msk related, EKG by triage w/ new LAD but very low suspicion any acute cardiac process, in depth d/w all about ddx, tx, dickson, close outpt fu.  - Conor Galvez MD   ----------------------------------------------------------------- ------------ATTENDING NOTE------------   38 yo M w/ family c/o >24 hrs of constant mild/moderate stabbing ache in L upper chest, started after coughing fit, describes unproductive cough and recent dx corona viral uri, no fevers, no exertional symptoms, very comfortable appearing in ED and walking w/o sob/distress, clear chest, nml VS (HR 70's, RR 14), significant past cardiac trauma but very likely chest wall / msk related, EKG by triage w/ new LAD but very low suspicion any acute cardiac process, in depth d/w all about ddx, tx, dickson, close outpt fu.  - Conor Galvez MD   -----------------------------------------------------------------

## 2019-08-07 ENCOUNTER — APPOINTMENT (OUTPATIENT)
Dept: PEDIATRIC ALLERGY IMMUNOLOGY | Facility: CLINIC | Age: 39
End: 2019-08-07

## 2020-07-24 NOTE — PROGRESS NOTE ADULT - PROBLEM/PLAN-1
Airway  Performed by: Meme Orozco MD  Authorized by: Meme Orozco MD     Final Airway Type:  Endotracheal airway  Final Endotracheal Airway*:  ETT  ETT Size (mm)*:  7.0  Cuff*:  Regular  Technique Used for Successful ETT Placement:  Direct laryngoscopy  Devices/Methods Used in Placement*:  Mask and Soft Bite Block  Intubation Procedure*:  Preoxygenation, ETCO2, Atraumatic, Dentition Unchanged, Phaynx Clear and Cricoid Pressure  Insertion Site:  Oral  Blade Type*:  MAC  Blade Size*:  3  Measured from*:  Lips  Secured at (cm)*:  20  Placement Verified by: auscultation, capnometry and equal breath sounds    Glottic View*:  1 - full view of glottis  Attempts*:  1  Number of Other Approaches Attempted:  0   Patient Identified, Procedure confirmed, Emergency equipment available and Safety protocols followed  Location:  OR  Urgency:  Elective  Difficult Airway: No    Indications for Airway Management:  Anesthesia  Spontaneous Ventilation: absent    Sedation Level:  Anesthetized  MILS Maintained Throughout: No    Mask Difficulty Assessment:  1 - vent by mask  Anesthesiologist:  Meme Orozco MD  Start Time:  7/24/2020 9:31 AM        
DISPLAY PLAN FREE TEXT

## 2020-11-21 NOTE — ED ADULT NURSE NOTE - MUSCULOSKELETAL WDL
Awake/Alert/Cooperative
Full range of motion of upper and lower extremities, no joint tenderness/swelling.

## 2020-11-23 NOTE — PROVIDER CONTACT NOTE (OTHER) - SITUATION
Refill needed to [id number]    Insulin Glargine, 1 Unit Dial, (TOUJEO SOLOSTAR) 300 UNIT/ML Subcutaneous Solution Pen-injector    3 month supply requested
Pts K level from during the day was 5.5
Pt currently receiving 2nd dose of IVIG. After 1st dose was completed at 0100 Lasix IVP was given. Lasix ordered for 6 AM. Called to clarify order.

## 2021-02-26 LAB
ALBUMIN SERPL ELPH-MCNC: 4.4 G/DL
ANION GAP SERPL CALC-SCNC: 13 MMOL/L
BUN SERPL-MCNC: 16 MG/DL
CALCIUM SERPL-MCNC: 8.8 MG/DL
CHLORIDE SERPL-SCNC: 104 MMOL/L
CO2 SERPL-SCNC: 24 MMOL/L
CREAT SERPL-MCNC: 1.14 MG/DL
CREAT SPEC-SCNC: 214 MG/DL
CREAT/PROT UR: 0.2 RATIO
GLUCOSE SERPL-MCNC: 93 MG/DL
PHOSPHATE SERPL-MCNC: 3.8 MG/DL
POTASSIUM SERPL-SCNC: 4.4 MMOL/L
PROT UR-MCNC: 47 MG/DL
SODIUM SERPL-SCNC: 140 MMOL/L

## 2021-03-18 ENCOUNTER — APPOINTMENT (OUTPATIENT)
Dept: NEPHROLOGY | Facility: CLINIC | Age: 41
End: 2021-03-18
Payer: SELF-PAY

## 2021-03-18 VITALS
DIASTOLIC BLOOD PRESSURE: 72 MMHG | WEIGHT: 177.47 LBS | SYSTOLIC BLOOD PRESSURE: 111 MMHG | HEIGHT: 64 IN | HEART RATE: 56 BPM | BODY MASS INDEX: 30.3 KG/M2 | OXYGEN SATURATION: 97 % | TEMPERATURE: 98.2 F

## 2021-03-18 DIAGNOSIS — N05.1 UNSPECIFIED NEPHRITIC SYNDROME WITH FOCAL AND SEGMENTAL GLOMERULAR LESIONS: ICD-10-CM

## 2021-03-18 PROCEDURE — 99213 OFFICE O/P EST LOW 20 MIN: CPT

## 2021-03-18 RX ORDER — ALBUTEROL SULFATE 90 UG/1
108 (90 BASE) AEROSOL, METERED RESPIRATORY (INHALATION)
Qty: 2 | Refills: 2 | Status: DISCONTINUED | COMMUNITY
Start: 2017-12-19 | End: 2021-03-18

## 2021-03-18 RX ORDER — LISINOPRIL 2.5 MG/1
2.5 TABLET ORAL
Qty: 30 | Refills: 0 | Status: DISCONTINUED | COMMUNITY
Start: 2017-08-24 | End: 2021-03-18

## 2021-03-18 RX ORDER — FLUTICASONE PROPIONATE 50 UG/1
50 SPRAY, METERED NASAL DAILY
Qty: 1 | Refills: 2 | Status: DISCONTINUED | COMMUNITY
Start: 2019-02-13 | End: 2021-03-18

## 2021-03-18 RX ORDER — BUDESONIDE AND FORMOTEROL FUMARATE DIHYDRATE 160; 4.5 UG/1; UG/1
160-4.5 AEROSOL RESPIRATORY (INHALATION) TWICE DAILY
Qty: 1 | Refills: 3 | Status: DISCONTINUED | COMMUNITY
Start: 2019-02-13 | End: 2021-03-18

## 2021-03-18 NOTE — REVIEW OF SYSTEMS
[Fever] : no fever [Chills] : no chills [Feeling Poorly] : not feeling poorly [Feeling Tired] : not feeling tired [Eyesight Problems] : no eyesight problems [Nosebleeds] : no nosebleeds [Chest Pain] : no chest pain [Palpitations] : no palpitations [Leg Claudication] : no intermittent leg claudication [Lower Ext Edema] : no extremity edema [Shortness Of Breath] : no shortness of breath [Abdominal Pain] : no abdominal pain [Vomiting] : no vomiting [Constipation] : no constipation [Diarrhea] : no diarrhea [Dysuria] : no dysuria [Incontinence] : no incontinence [Arthralgias] : no arthralgias [Joint Pain] : no joint pain [Dizziness] : no dizziness [Fainting] : no fainting [Anxiety] : no anxiety [Depression] : no depression [Easy Bleeding] : no tendency for easy bleeding [Easy Bruising] : no tendency for easy bruising

## 2021-03-18 NOTE — HISTORY OF PRESENT ILLNESS
[FreeTextEntry1] : Mr Vik Bone is a 37 years old   whom I originally met at the [facility] where he presented with GIFTY and proteinuria with acute polyarthralgia.  At that time he was diagnosed with parvovirus-associated collapsing glomerulopathy.  He was re-admitted at [facility] for a five day treatment of intravenous immunoglobulin to help treat parvo-virus.  The patients' viral load came down from 500,000 copies pre-treatment to 11,000 copies post treatment.  His proteinuria peaked at 7 grams and then to 1.3 grams.  He feels somewhat better today upon treatment.  We started him on an ACE - I but dose was reduced at last visit for hypotension.\par \par 8/24 The patient is seen today for follow up.  He has a number of complaints.  He still feels tired but improved.  He has good appetite but has continued weight loss.  He has noted progressive pain at the site of the old biopsy.  \par \par 9/1 Patient feels improved since last visit.  We decreased his ACE I dose last visit.  Today we discussed in depth about various social aspects of his life including how he grew up in a harsh neighborhood in [place].  His wife also disclosed that she has been undergoing a lot of stress as well.  The patient denies any SOB fatigue is improved.  At my recommendation last time he cut out marijuana.  It does not seem that the patient went for the CT scan that was ordered last time.\par \par 12/22 Since our last visit the patient was in the ED complaining of fever and was found to be corona virus positive.  He feels better now but still has not had a good appetite.\par \par 7/6/18 Vik came back to see me now a few months later.  He has felt well recently but admits that he has been drinking again.  He also admits that he hasn't taken his lisinopril in nearly two months.  \par \par 1/14/19 I saw Vik today and he is doing well overall.  He has not taken his medications.  He has been feeling well overall.  He has been wheezing a lot at night.  He isn't wearing a mask near the concrete dust at his work.  He feels short of breath at times but it is alleviated by his inhaler.\par \par 3/18/21 - Patient presented for follow up, reports feeling well, has been watching his diet and trying to avoid red meats. No acute complaints at this time. He has not been taking lisinopril anymore. He unfortunately reports that was laid off from his job, and currently he is uninsured. Discussed genetic testing but would like to postpone due to insurance issues.

## 2021-03-18 NOTE — ASSESSMENT
[FreeTextEntry1] : Mr Bone is a 40 years old man with collapsing glomerulopathy seen today for follow up.\par \par Collapsing Glomerulopathy -- The patient's creatinine has been stable. Last sCr at 1.14mg/dl (2/26/2021) and prot/cr ratio of 0.2g/g.  On evaluation today he has no acute complaints. Compliant with diet, avoiding red meats. He has not been taking lisinopril and currently he is uninsured. Discussed genetic testing but would like to postpone due to insurance issues.\par \par RTO six months.

## 2021-09-17 ENCOUNTER — APPOINTMENT (OUTPATIENT)
Dept: NEPHROLOGY | Facility: CLINIC | Age: 41
End: 2021-09-17

## 2022-02-25 NOTE — DISCHARGE NOTE ADULT - NSFTFHOMEHTHYNRD_GEN_ALL_CORE
PACU ANESTHESIA ADMISSION NOTE      Procedure:   Post op diagnosis:      ____  Intubated  TV:______       Rate: ______      FiO2: ______    __x__  Patent Airway    __x__  Full return of protective reflexes    __x__  Full recovery from anesthesia / back to baseline     Vitals:   See Anesthesia record  T97.2 P- 84 R-16 B/P- 107/65 SPO2- 96% on RA    Mental Status:  __x__ Awake   ___x__ Alert   _____ Drowsy   _____ Sedated    Nausea/Vomiting:  __x__ NO  ______Yes,   See Post - Op Orders          Pain Scale (0-10):  _0___    Treatment: ____ None    ____ See Post - Op/PCA Orders    Post - Operative Fluids:   ____ Oral   __x__ See Post - Op Orders    Plan: Discharge:   __x__Home       _____Floor     _____Critical Care    _____  Other:_________________    Comments: No anesthesia complications/issues noted. Discharge to HOME when PACU criteria met.
No

## 2022-11-11 NOTE — ED ADULT NURSE REASSESSMENT NOTE - NS ED NURSE REASSESS COMMENT FT1
Pt admitted Made comfortable
Pt admitted for IVIG Afebrile To HA Denies any discomfort Resp even and nonlab
Yes...

## 2023-03-06 ENCOUNTER — NON-APPOINTMENT (OUTPATIENT)
Age: 43
End: 2023-03-06

## 2023-06-22 NOTE — DIETITIAN INITIAL EVALUATION ADULT. - WEIGHT CHANGE
----- Message from Jelly Hummel LPN sent at 6/16/2023 10:06 AM CDT -----  Regarding: cmp elevated Liver enzymes & K+  To be drawn on 6/19/23   [facility] Drawstation (may/may not received results same day) / Every monday labs  [facility] ph [phone] f- [phone]    Lab order faxed to [facility] and scheduled with [facility]      yes

## 2025-07-02 NOTE — H&P ADULT - REASON FOR ADMISSION
PT/OT Eval and treat  Trend Mobility Scores  Encourage appropriate mobility to achieve and improve upon HLM Goals as noted   Admission for IVIG infusion for FSGS